# Patient Record
Sex: MALE
[De-identification: names, ages, dates, MRNs, and addresses within clinical notes are randomized per-mention and may not be internally consistent; named-entity substitution may affect disease eponyms.]

---

## 2019-08-21 ENCOUNTER — HOSPITAL ENCOUNTER (EMERGENCY)
Dept: HOSPITAL 56 - MW.ED | Age: 84
Discharge: HOME | End: 2019-08-21
Payer: OTHER GOVERNMENT

## 2019-08-21 VITALS — DIASTOLIC BLOOD PRESSURE: 67 MMHG | SYSTOLIC BLOOD PRESSURE: 120 MMHG

## 2019-08-21 DIAGNOSIS — E03.9: ICD-10-CM

## 2019-08-21 DIAGNOSIS — I80.02: Primary | ICD-10-CM

## 2019-08-21 DIAGNOSIS — M71.22: ICD-10-CM

## 2019-08-21 DIAGNOSIS — D47.3: ICD-10-CM

## 2019-08-21 DIAGNOSIS — Z79.899: ICD-10-CM

## 2019-08-21 LAB
CHLORIDE SERPL-SCNC: 106 MMOL/L (ref 98–107)
SODIUM SERPL-SCNC: 141 MMOL/L (ref 136–148)

## 2019-08-21 NOTE — EDM.PDOC
ED HPI GENERAL MEDICAL PROBLEM





- General


Chief Complaint: Lower Extremity Injury/Pain


Stated Complaint: SWOLLEN LFT FOOT


Time Seen by Provider: 08/21/19 11:42


Source of Information: Reports: Patient


History Limitations: Reports: No Limitations





- History of Present Illness


INITIAL COMMENTS - FREE TEXT/NARRATIVE: 


HISTORY AND PHYSICAL:





History of present illness:


Patient is an 87-year-old male presents to the ED today with concern of left 

lower extremity swelling 4 days. Patient states he first noticed the symptoms 

a few days ago. Patient states he does have some discomfort in this leg but 

states it is minimal. Patient denies any redness of the area or any injury of 

the extremity. Patient denies any h/o blood clots. Patient denies any health 

history or any other symptoms or concerns at this time.





Patient denies fever, chills, chest pain, shortness of breath, or cough. Denies 

headache, neck stiff ness, change in vision, syncope, or near syncope. Denies 

nausea, vomiting, abdominal pain, diarrhea, constipation, or dysuria. Has not 

noted any blood in urine or stool. Patient has been eating and drinking 

appropriately.





Review of systems: 


As per history of present illness and below otherwise all systems reviewed and 

negative.





Past medical history: 


As per history of present illness and as reviewed below otherwise 

noncontributory.





Surgical history: 


As per history of present illness and as reviewed below otherwise 

noncontributory.





Social history: 


See social history for further information





Family history: 


As per history of present illness and as reviewed below otherwise 

noncontributory.





Physical exam:


General: Patient is alert, oriented, and in no acute distress. Patient sitting 

comfortably on exam table.


HEENT: Atraumatic, normocephalic, pupils equal and reactive bilaterally, 

negative for conjunctival pallor or scleral icterus, mucous membranes moist, 

TMs normal bilaterally, throat clear, neck supple, nontender, trachea midline. 

No drooling or trismus noted. No meningeal signs. No hot potato voice noted. 


Lungs: Clear to auscultation, breath sounds equal bilaterally, chest nontender.


Heart: S1S2, regular rate and rhythm without overt murmur


Abdomen: Soft, nondistended, nontender. Negative for masses or 

hepatosplenomegaly. Negative for costovertebral tenderness.


Pelvis: Stable nontender.


Genitourinary: Deferred.


Rectal: Deferred.


Skin: Intact, warm, dry. No lesions or rashes noted.


Extremities: Atraumatic, negative for cords or calf pain. Neurovascular 

unremarkable. Left lower extremity is moderately edematous in comparison to 

right with 2+ pitting edema to the knee. Negative Homans sign. No erythema of 

the lower extremity and no break in skin. DP/PT pulses intact of left lower 

extremity and cap refill less than 2 seconds.


Neuro: Awake, alert, oriented. Cranial nerves II through XII unremarkable. 

Cerebellum unremarkable. Motor and sensory unremarkable throughout. Exam 

nonfocal.





Notes:


Discussed the importance for follow-up with his primary care provider and for 

repeat lab work including further investigation of platelets. Patient has had 

similar elevation and platelets in the past and states that he was not aware of 

this and has never had that evaluated. Patient does have a primary care 

provider at [place] and discussed the importance for close follow-up.


Voices understanding and is agreeable to plan of care. Denies any further 

questions or concerns at this time.





Diagnostics:


CBC, CMP, UA, EKG, lower extremity ultrasound, PT/INR, Peripheral blood smear





Therapeutics:


None





Prescription:


None





Impression: 


Superficial thrombophlebitis, left extremity


Popliteal cyst, left extremity


Thrombocytosis, unspecified





Plan:


1. You can use Tylenol as directed for pain and discomfort.


2. Follow-up with your primary care provider as discussed and for repeat 

labwork. Return to the ED as needed and as discussed.





Definitive disposition and diagnosis as appropriate pending reevaluation and 

review of above.








- Related Data


 Allergies











Allergy/AdvReac Type Severity Reaction Status Date / Time


 


No Known Allergies Allergy   Verified 08/21/19 11:59











Home Meds: 


 Home Meds





Digoxin 0.25 mg PO DAILY 07/06/16 [History]


Levothyroxine Sodium [Synthroid] 0.025 mg PO DAILY 07/06/16 [History]


Tamsulosin [Flomax] 0.4 mg PO ONETIME #10 cap.er 01/15/17 [Rx]


Finasteride  08/21/19 [History]


Hydroxyurea [Hydrea]  08/21/19 [History]


Pantoprazole Sodium 40 mg PO DAILY 08/21/19 [History]











Past Medical History


HEENT History: Reports: Hard of Hearing, Impaired Vision, Other (See Below)


Other HEENT History: right ear deaf, left ear with hearing aide,


Cardiovascular History: Reports: Arrhythmia


Other Cardiovascular History: paroxysmal atrial tachycardia in april 2016, rx 

with digitalis. rate controlled now.  active asymptomatic


Respiratory History: Reports: Other (See Below)


Other Respiratory History: pneumonia with sepsis in april 2016


Gastrointestinal History: Reports: Other (See Below)


Other Gastrointestinal History: Inguinal hernia, GI b;leed in April 2016, Hb 

now 12.6 on May 13, 2016


Genitourinary History: Reports: None


Musculoskeletal History: Reports: Fracture


Neurological History: Reports: None


Psychiatric History: Reports: None


Endocrine/Metabolic History: Reports: Hypothyroidism


Other Endocrine/Metabolic History: on thyroid replacement


Hematologic History: Reports: Anemia


Other Hematologic History: hospitalized in april with pneumonia and anemia


Immunologic History: Reports: None


Oncologic (Cancer) History: Reports: None


Dermatologic History: Reports: None





- Infectious Disease History


Infectious Disease History: Reports: Chicken Pox, Measles, Mumps





- Past Surgical History


HEENT Surgical History: Reports: Other (See Below)


Musculoskeletal Surgical History: Reports: Other (See Below)





Social & Family History





- Family History


Family Medical History: Noncontributory





- Caffeine Use


Caffeine Use: Reports: Coffee





- Living Situation & Occupation


Living situation: Reports: 


Occupation: Retired





Review of Systems





- Review of Systems


Review Of Systems: ROS reveals no pertinent complaints other than HPI.





ED EXAM, GENERAL





- Physical Exam


Exam: See Below (see dictation)





Course





- Vital Signs


Last Recorded V/S: 


 Last Vital Signs











Temp  35.7 C   08/21/19 11:57


 


Pulse  64   08/21/19 11:57


 


Resp  18   08/21/19 11:57


 


BP  141/85 H  08/21/19 11:57


 


Pulse Ox  97   08/21/19 11:57














- Orders/Labs/Meds


Orders: 


 Active Orders 24 hr











 Category Date Time Status


 


 EKG Documentation Completion [RC] STAT Care  08/21/19 12:04 Active


 


 UA RFX MAHENDRA AND CULT IF INDIC [URIN] Stat Lab  08/21/19 12:01 Ordered











Labs: 


 Laboratory Tests











  08/21/19 08/21/19 08/21/19 Range/Units





  13:09 13:09 13:09 


 


WBC  10.61    (4.0-11.0)  K/uL


 


RBC  3.84 L    (4.50-5.90)  M/uL


 


Hgb  11.4 L    (13.0-17.0)  g/dL


 


Hct  35.8 L    (38.0-50.0)  %


 


MCV  93.2    (80.0-98.0)  fL


 


MCH  29.7    (27.0-32.0)  pg


 


MCHC  31.8    (31.0-37.0)  g/dL


 


RDW Std Deviation  80.3 H    (28.0-62.0)  fl


 


RDW Coeff of Adan  24 H    (11.0-15.0)  %


 


Plt Count  1163 H    (150-400)  K/uL


 


MPV  9.50    (7.40-12.00)  fL


 


Neut % (Auto)  71.7    (48.0-80.0)  %


 


Lymph % (Auto)  13.2 L    (16.0-40.0)  %


 


Mono % (Auto)  10.1    (0.0-15.0)  %


 


Eos % (Auto)  4.1    (0.0-7.0)  %


 


Baso % (Auto)  0.9    (0.0-1.5)  %


 


Neut # (Auto)  7.6 H    (1.4-5.7)  K/uL


 


Lymph # (Auto)  1.4    (0.6-2.4)  K/uL


 


Mono # (Auto)  1.1 H    (0.0-0.8)  K/uL


 


Eos # (Auto)  0.4    (0.0-0.7)  K/uL


 


Baso # (Auto)  0.1    (0.0-0.1)  K/uL


 


Nucleated RBC %  0.0    /100WBC


 


Nucleated RBCs #  0    K/uL


 


Smear Path Review     


 


INR   1.10   


 


Sodium    141  (136-148)  mmol/L


 


Potassium    4.7  (3.5-5.1)  mmol/L


 


Chloride    106  ()  mmol/L


 


Carbon Dioxide    27.3  (21.0-32.0)  mmol/L


 


BUN    16  (7.0-18.0)  mg/dL


 


Creatinine    1.0  (0.8-1.3)  mg/dL


 


Est Cr Clr Drug Dosing    51.75  mL/min


 


Estimated GFR (MDRD)    > 60.0  ml/min


 


Glucose    75  ()  mg/dL


 


Calcium    8.7  (8.5-10.1)  mg/dL


 


Total Bilirubin    0.6  (0.2-1.0)  mg/dL


 


AST    21  (15-37)  IU/L


 


ALT    16  (14-63)  IU/L


 


Alkaline Phosphatase    87  ()  U/L


 


Total Protein    6.0 L  (6.4-8.2)  g/dL


 


Albumin    3.1 L  (3.4-5.0)  g/dL


 


Globulin    2.9  (2.6-4.0)  g/dL


 


Albumin/Globulin Ratio    1.1  (0.9-1.6)  














  08/21/19 Range/Units





  13:09 


 


WBC   (4.0-11.0)  K/uL


 


RBC   (4.50-5.90)  M/uL


 


Hgb   (13.0-17.0)  g/dL


 


Hct   (38.0-50.0)  %


 


MCV   (80.0-98.0)  fL


 


MCH   (27.0-32.0)  pg


 


MCHC   (31.0-37.0)  g/dL


 


RDW Std Deviation   (28.0-62.0)  fl


 


RDW Coeff of Adan   (11.0-15.0)  %


 


Plt Count   (150-400)  K/uL


 


MPV   (7.40-12.00)  fL


 


Neut % (Auto)   (48.0-80.0)  %


 


Lymph % (Auto)   (16.0-40.0)  %


 


Mono % (Auto)   (0.0-15.0)  %


 


Eos % (Auto)   (0.0-7.0)  %


 


Baso % (Auto)   (0.0-1.5)  %


 


Neut # (Auto)   (1.4-5.7)  K/uL


 


Lymph # (Auto)   (0.6-2.4)  K/uL


 


Mono # (Auto)   (0.0-0.8)  K/uL


 


Eos # (Auto)   (0.0-0.7)  K/uL


 


Baso # (Auto)   (0.0-0.1)  K/uL


 


Nucleated RBC %   /100WBC


 


Nucleated RBCs #   K/uL


 


Smear Path Review  SENT TO PATHOLOGY  


 


INR   


 


Sodium   (136-148)  mmol/L


 


Potassium   (3.5-5.1)  mmol/L


 


Chloride   ()  mmol/L


 


Carbon Dioxide   (21.0-32.0)  mmol/L


 


BUN   (7.0-18.0)  mg/dL


 


Creatinine   (0.8-1.3)  mg/dL


 


Est Cr Clr Drug Dosing   mL/min


 


Estimated GFR (MDRD)   ml/min


 


Glucose   ()  mg/dL


 


Calcium   (8.5-10.1)  mg/dL


 


Total Bilirubin   (0.2-1.0)  mg/dL


 


AST   (15-37)  IU/L


 


ALT   (14-63)  IU/L


 


Alkaline Phosphatase   ()  U/L


 


Total Protein   (6.4-8.2)  g/dL


 


Albumin   (3.4-5.0)  g/dL


 


Globulin   (2.6-4.0)  g/dL


 


Albumin/Globulin Ratio   (0.9-1.6)  














Departure





- Departure


Time of Disposition: 14:24


Disposition: Home, Self-Care 01


Clinical Impression: 


 Superficial thrombophlebitis of left leg, Thrombocytosis





Popliteal cyst


Qualifiers:


 Laterality: left Qualified Code(s): M71.22 - Synovial cyst of popliteal space [

Baker], left knee








- Discharge Information


Instructions:  Thrombophlebitis


Referrals: 


Anjel Monahan MD [Primary Care Provider] - 


Forms:  ED Department Discharge


Additional Instructions: 


The following information is given to patients seen in the emergency department 

who are being discharged to home. This information is to outline your options 

for follow-up care. We provide all patients seen in our emergency department 

with a follow-up referral.





The need for follow-up, as well as the timing and circumstances, are variable 

depending upon the specifics of your emergency department visit.





If you don't have a primary care physician on staff, we will provide you with a 

referral. We always advise you to contact your personal physician following an 

emergency department visit to inform them of the circumstance of the visit and 

for follow-up with them and/or the need for any referrals to a consulting 

specialist.





The emergency department will also refer you to a specialist when appropriate. 

This referral assures that you have the opportunity for follow-up care with a 

specialist. All of these measure are taken in an effort to provide you with 

optimal care, which includes your follow-up.





Under all circumstances we always encourage you to contact your private 

physician who remains a resource for coordinating your care. When calling for 

follow-up care, please make the office aware that this follow-up is from your 

recent emergency room visit. If for any reason you are refused follow-up, 

please contact the [facility] Emergency 

Department at [phone] and asked to speak to the emergency department 

charge nurse.





[facility]


Primary Care


[address] [place] [address]


Phone: [phone]


Fax: [phone]





[facility]


[address] [place] [address]


Phone: [phone]


Fax: [phone]





1. You can use Tylenol as directed for pain and discomfort. Packet on 

thrombophlebitis provided to you in this packet.


2. Follow-up with your primary care provider as discussed and for repeat 

labwork. Return to the ED as needed and as discussed.








 








- My Orders


Last 24 Hours: 


My Active Orders





08/21/19 12:01


UA RFX MAHENDRA AND CULT IF INDIC [URIN] Stat 





08/21/19 12:04


EKG Documentation Completion [RC] STAT 














- Assessment/Plan


Last 24 Hours: 


My Active Orders





08/21/19 12:01


UA RFX MAHENDRA AND CULT IF INDIC [URIN] Stat 





08/21/19 12:04


EKG Documentation Completion [RC] STAT

## 2019-08-21 NOTE — US
INDICATION:



Leg pain and swelling 



TECHNIQUE:



Ultrasound venous duplex lower left extremity.  Compression venous exam was 

performed using gray-scale, color Doppler, and spectral Doppler analysis.



COMPARISON:



None.



FINDINGS:



Sonographic imaging demonstrates the left common femoral, deep femoral, 

superficial femoral, popliteal, posterior tibial and greater saphenous and 

the contralateral right common femoral veins to be fully compressible with 

normal color Doppler blood flow. There is thrombosis involving severe 

varicose veins in the upper calf. A popliteal cyst with internal debris 

measures 4 x 2 x 1 cm. 



IMPRESSION:



No sign of DVT. There is acute superficial thrombophlebitis of varicose 

veins in the upper calf. A popliteal cyst is present.



Dictated by Clarke Garza MD @ Aug 21 2019  1:46PM



Signed by Dr. Clarke Garza @ Aug 21 2019  1:52PM

## 2019-10-31 ENCOUNTER — HOSPITAL ENCOUNTER (EMERGENCY)
Dept: HOSPITAL 56 - MW.ED | Age: 84
Discharge: HOME | End: 2019-10-31
Payer: OTHER GOVERNMENT

## 2019-10-31 VITALS — DIASTOLIC BLOOD PRESSURE: 54 MMHG | HEART RATE: 60 BPM | SYSTOLIC BLOOD PRESSURE: 133 MMHG

## 2019-10-31 DIAGNOSIS — S51.012A: Primary | ICD-10-CM

## 2019-10-31 DIAGNOSIS — Z23: ICD-10-CM
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
## 2019-10-31 DIAGNOSIS — Z79.82: ICD-10-CM

## 2019-10-31 DIAGNOSIS — H91.3: ICD-10-CM
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
## 2019-10-31 LAB
BUN SERPL-MCNC: 15 MG/DL (ref 7–18)
CHLORIDE SERPL-SCNC: 103 MMOL/L (ref 98–107)
CO2 SERPL-SCNC: 24.3 MMOL/L (ref 21–32)
GLUCOSE SERPL-MCNC: 81 MG/DL (ref 74–106)
POTASSIUM SERPL-SCNC: 4.5 MMOL/L (ref 3.5–5.1)
SODIUM SERPL-SCNC: 138 MMOL/L (ref 136–148)

## 2019-10-31 NOTE — CR
EXAM DATE: 10/31/19



PATIENT'S AGE: 88



Left elbow: Three views left elbow were obtained.



Comparison: No previous elbow study.



Minimal calcifications are seen off the medial and lateral epicondyles 
compatible with minimal tendinous calcification.  Bony structures are slightly 
osteopenic.  No acute fracture, dislocation or other bony abnormality is seen.



Impression:

1.  Findings which are felt to be incidental as noted above.

2.  Nothing acute is seen on left elbow study.



Diagnostic code #2



Report Signed by Proxy.

[email or web address]

## 2019-10-31 NOTE — EDM.PDOC
ED HPI GENERAL MEDICAL PROBLEM





- General


Chief Complaint: Upper Extremity Injury/Pain


Stated Complaint: PT FELL, LEFT ELBOW PAIN


Time Seen by Provider: 10/31/19 11:09


Source of Information: Reports: Patient


History Limitations: Reports: No Limitations





- History of Present Illness


INITIAL COMMENTS - FREE TEXT/NARRATIVE: 


HISTORY AND PHYSICAL:





History of present illness:


Patient is an 88-year-old male who presents and ambulates to the ED today with 

concern of elbow injury that occurred 4 days ago. 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 Patient states that he had pulled 

out a toe of decorations in his entryway and slipped on the carpet as he was 

pulling the tote of decorations. Patient states that he landed on his butt and 

scraped his elbow against the tote and had caught himself with his left elbow. 

Patient states he did not hit his head and did not lose consciousness. Patient 

states that he was not dizzy prior to this episode but just had slipped with 

carrying the tote of decorations. Patient states other than his left elbow he 

has no other symptoms or concerns. Patient states that he has tried some on 

dressings and his wife has tried to dress the wound of his left elbow but it 

continues to bleed. Patient states that she had put some Neosporin on it 

without relief of symptoms. Patient denies any other symptoms or concerns. 

Patient states he is not up-to-date on his tetanus vaccine.





Patient denies fever, chills, chest pain, shortness of breath, or cough. Denies 

headache, neck stiff ness, change in vision, syncope, or near syncope. Denies 

nausea, vomiting, abdominal pain, diarrhea, constipation, or dysuria. Has not 

noted any blood in urine or stool. Patient has been eating and drinking 

appropriately.





Review of systems: 


As per history of present illness and below otherwise all systems reviewed and 

negative.





Past medical history: 


As per history of present illness and as reviewed below otherwise 

noncontributory.





Surgical history: 


As per history of present illness and as reviewed below otherwise 

noncontributory.





Social history: 


See social history for further information





Family history: 


As per history of present illness and as reviewed below otherwise 

noncontributory.





Physical exam:


General: Patient is alert, oriented, and in no acute distress. Patient sitting 

comfortably on exam table.


HEENT: Atraumatic, normocephalic, pupils equal and reactive bilaterally, 

negative for conjunctival pallor or scleral icterus, mucous membranes moist, 

TMs normal bilaterally, throat clear, neck supple, nontender, trachea midline. 

No drooling or trismus noted. No meningeal signs. No hot potato voice noted. 


Lungs: Clear to auscultation, breath sounds equal bilaterally, chest nontender.


Heart: S1S2, regular rate and rhythm without overt murmur


Abdomen: Soft, nondistended, nontender. Negative for masses or 

hepatosplenomegaly. Negative for costovertebral tenderness.


Pelvis: Stable nontender.


Genitourinary: Deferred.


Rectal: Deferred.


Skin: Intact, warm, dry. No lesions or rashes noted.


Extremities: Negative for cords or calf pain. Neurovascular unremarkable. There 

is a 2 cm circular skin tear of the left elbow with mild bleeding. Patient has 

complete range of motion of upper and lower extremities without pain or 

difficulty. Radial pulses are grossly intact bilaterally with capillary refill 

less than 2 seconds. No obvious deformity of the complete spine. No step-offs, 

crepitus, or point tenderness to palpation of the complete spine.


Neuro: Awake, alert, oriented. Cranial nerves II through XII unremarkable. 

Cerebellum unremarkable. Motor and sensory unremarkable throughout. Exam 

nonfocal.





Notes:


Discussed the importance for follow-up with a primary care provider.


Voices understanding and is agreeable to plan of care. Denies any further 

questions or concerns at this time.





Diagnostics:


CBC, CMP, UA, EKG, chest x-ray, troponin, elbow x-ray, PT/INR





Therapeutics:


SurgiSeal sterile dressing, tdap





Prescription:


None





Impression: 


Left elbow injury


Skin tear of elbow, left





Plan:


1. Rest, ice, elevate the affected extremity. You can apply ice 15 minutes on, 

15 minutes off. Keep the dressing on applied today for 24 hours before removal.


2. Tylenol as directed for pain management or discomfort. 


3. Follow up with the primary care provider as discussed. Return to the ED as 

needed and as discussed.





Definitive disposition and diagnosis as appropriate pending reevaluation and 

review of above.





- Related Data


 Allergies











Allergy/AdvReac Type Severity Reaction Status Date / Time


 


No Known Allergies Allergy   Verified 10/31/19 11:14











Home Meds: 


 Home Meds





Digoxin 0.25 mg PO DAILY 07/06/16 [History]


Tamsulosin [Flomax] 0.4 mg PO ONETIME #10 cap.er 01/15/17 [Rx]


Finasteride 5 mg PO ASDIRECTED 08/21/19 [History]


Hydroxyurea [Hydrea] 500 mg PO ASDIRECTED 08/21/19 [History]


Pantoprazole Sodium 40 mg PO DAILY 08/21/19 [History]


Aspirin 81 mg PO DAILY 10/31/19 [History]











Past Medical History


HEENT History: Reports: Hard of Hearing, Impaired Vision, Other (See Below)


Other HEENT History: right ear deaf, left ear with hearing aide,


Cardiovascular History: Reports: Arrhythmia


Other Cardiovascular History: paroxysmal atrial tachycardia in april 2016, rx 

with digitalis. rate controlled now.  active asymptomatic


Respiratory History: Reports: Other (See Below)


Other Respiratory History: pneumonia with sepsis in april 2016


Gastrointestinal History: Reports: Other (See Below)


Other Gastrointestinal History: Inguinal hernia, GI b;leed in April 2016, Hb 

now 12.6 on May 13, 2016


Genitourinary History: Reports: None


Musculoskeletal History: Reports: Fracture


Neurological History: Reports: None


Psychiatric History: Reports: None


Endocrine/Metabolic History: Reports: Hypothyroidism


Other Endocrine/Metabolic History: on thyroid replacement


Hematologic History: Reports: Anemia


Other Hematologic History: hospitalized in april with pneumonia and anemia


Immunologic History: Reports: None


Oncologic (Cancer) History: Reports: None


Dermatologic History: Reports: None





- Infectious Disease History


Infectious Disease History: Reports: Chicken Pox, Measles, Mumps





- Past Surgical History


Head Surgeries/Procedures: Reports: None


HEENT Surgical History: Reports: Other (See Below)


Musculoskeletal Surgical History: Reports: Other (See Below)





Social & Family History





- Family History


Family Medical History: Noncontributory





- Tobacco Use


Smoking Status *Q: Never Smoker


Second Hand Smoke Exposure: No





- Caffeine Use


Caffeine Use: Reports: None





- Recreational Drug Use


Recreational Drug Use: No





- Living Situation & Occupation


Living situation: Reports: 


Occupation: Retired





Review of Systems





- Review of Systems


Review Of Systems: ROS reveals no pertinent complaints other than HPI.





ED EXAM, GENERAL





- Physical Exam


Exam: See Below (See dictation)





Course





- Vital Signs


Last Recorded V/S: 


 Last Vital Signs











Temp  96.4 F   10/31/19 11:14


 


Pulse  60   10/31/19 11:14


 


Resp  16   10/31/19 11:14


 


BP  133/54 L  10/31/19 11:14


 


Pulse Ox  98   10/31/19 11:14














- Orders/Labs/Meds


Orders: 


 Active Orders 24 hr











 Category Date Time Status


 


 EKG Documentation Completion [RC] STAT Care  10/31/19 11:32 Active


 


 Vaccines to be Administered [RC] PER UNIT ROUTINE Care  10/31/19 11:45 Active


 


 UA RFX MAHENDRA AND CULT IF INDIC [URIN] Stat Lab  10/31/19 11:32 Ordered











Labs: 


 Laboratory Tests











  10/31/19 10/31/19 10/31/19 Range/Units





  12:03 12:03 12:03 


 


WBC  12.07 H    (4.0-11.0)  K/uL


 


RBC  4.43 L    (4.50-5.90)  M/uL


 


Hgb  12.4 L    (13.0-17.0)  g/dL


 


Hct  39.2    (38.0-50.0)  %


 


MCV  88.5    (80.0-98.0)  fL


 


MCH  28.0    (27.0-32.0)  pg


 


MCHC  31.6    (31.0-37.0)  g/dL


 


RDW Std Deviation  78.3 H    (28.0-62.0)  fl


 


RDW Coeff of Adan  25 H    (11.0-15.0)  %


 


Plt Count  1402 H    (150-400)  K/uL


 


MPV  9.40    (7.40-12.00)  fL


 


Neut % (Auto)  70.7    (48.0-80.0)  %


 


Lymph % (Auto)  15.2 L    (16.0-40.0)  %


 


Mono % (Auto)  10.3    (0.0-15.0)  %


 


Eos % (Auto)  3.1    (0.0-7.0)  %


 


Baso % (Auto)  0.7    (0.0-1.5)  %


 


Neut # (Auto)  8.5 H    (1.4-5.7)  K/uL


 


Lymph # (Auto)  1.8    (0.6-2.4)  K/uL


 


Mono # (Auto)  1.2 H    (0.0-0.8)  K/uL


 


Eos # (Auto)  0.4    (0.0-0.7)  K/uL


 


Baso # (Auto)  0.1    (0.0-0.1)  K/uL


 


Nucleated RBC %  0.0    /100WBC


 


Nucleated RBCs #  0    K/uL


 


INR   1.13   


 


Sodium    138  (136-148)  mmol/L


 


Potassium    4.5  (3.5-5.1)  mmol/L


 


Chloride    103  ()  mmol/L


 


Carbon Dioxide    24.3  (21.0-32.0)  mmol/L


 


BUN    15  (7.0-18.0)  mg/dL


 


Creatinine    1.0  (0.8-1.3)  mg/dL


 


Est Cr Clr Drug Dosing    50.78  mL/min


 


Estimated GFR (MDRD)    > 60.0  ml/min


 


Glucose    81  ()  mg/dL


 


Calcium    8.7  (8.5-10.1)  mg/dL


 


Total Bilirubin    0.5  (0.2-1.0)  mg/dL


 


AST    32  (15-37)  IU/L


 


ALT    18  (14-63)  IU/L


 


Alkaline Phosphatase    79  ()  U/L


 


Troponin I    < 0.050  (0.000-0.056)  ng/mL


 


Total Protein    7.7  (6.4-8.2)  g/dL


 


Albumin    3.8  (3.4-5.0)  g/dL


 


Globulin    3.9  (2.6-4.0)  g/dL


 


Albumin/Globulin Ratio    1.0  (0.9-1.6)  











Meds: 


Medications














Discontinued Medications














Generic Name Dose Route Start Last Admin





  Trade Name Freq  PRN Reason Stop Dose Admin


 


Tetanus/Diphtheria Toxoids  0.5 ml  10/31/19 11:45  10/31/19 12:00





  Tenivac  IM  10/31/19 11:46  0.5 ml





  .ONCE ONE   Administration





     





     





     





     














Departure





- Departure


Time of Disposition: 12:51


Disposition: Home, Self-Care 01


Clinical Impression: 


Injury of left elbow


Qualifiers:


 Encounter type: initial encounter Qualified Code(s): S59.902A - Unspecified 

injury of left elbow, initial encounter





Skin tear of elbow without complication


Qualifiers:


 Encounter type: initial encounter Laterality: left Qualified Code(s): S51.012A 

- Laceration without foreign body of left elbow, initial encounter








- Discharge Information


Instructions:  Musculoskeletal Pain


Referrals: 


PCP,Unobtain [Primary Care Provider] - 


Forms:  ED Department Discharge


Additional Instructions: 


The following information is given to patients seen in the emergency department 

who are being discharged to home. This information is to outline your options 

for follow-up care. We provide all patients seen in our emergency department 

with a follow-up referral.





The need for follow-up, as well as the timing and circumstances, are variable 

depending upon the specifics of your emergency department visit.





If you don't have a primary care physician on staff, we will provide you with a 

referral. We always advise you to contact your personal physician following an 

emergency department visit to inform them of the circumstance of the visit and 

for follow-up with them and/or the need for any referrals to a consulting 

specialist.





The emergency department will also refer you to a specialist when appropriate. 

This referral assures that you have the opportunity for follow-up care with a 

specialist. All of these measure are taken in an effort to provide you with 

optimal care, which includes your follow-up.





Under all circumstances we always encourage you to contact your private 

physician who remains a resource for coordinating your care. When calling for 

follow-up care, please make the office aware that this follow-up is from your 

recent emergency room visit. If for any reason you are refused follow-up, 

please contact the [facility] Emergency 

Department at [phone] and asked to speak to the emergency department 

charge nurse.





[facility]


Primary Care


[address] [place] [address]


Phone: [phone]


Fax: [phone]





[place] [address]


Phone: [phone]


Fax: [phone]








1. Rest, ice, elevate the affected extremity. You can apply ice 15 minutes on, 

15 minutes off. Keep the dressing on applied today for 24 hours before removal.


2. Tylenol as directed for pain management or discomfort. 


3. Follow up with the primary care provider as discussed. Return to the ED as 

needed and as discussed.





 








- My Orders


Last 24 Hours: 


My Active Orders





10/31/19 11:32


EKG Documentation Completion [RC] STAT 


UA RFX MAHENDRA AND CULT IF INDIC [URIN] Stat 





10/31/19 11:45


Vaccines to be Administered [RC] PER UNIT ROUTINE 














- Assessment/Plan


Last 24 Hours: 


My Active Orders





10/31/19 11:32


EKG Documentation Completion [RC] STAT 


UA RFX MAHENDRA AND CULT IF INDIC [URIN] Stat 





10/31/19 11:45


Vaccines to be Administered [RC] PER UNIT ROUTINE

## 2020-03-16 ENCOUNTER — HOSPITAL ENCOUNTER (INPATIENT)
Dept: HOSPITAL 56 - MW.ED | Age: 85
LOS: 2 days | DRG: 872 | End: 2020-03-18
Attending: INTERNAL MEDICINE | Admitting: INTERNAL MEDICINE
Payer: OTHER GOVERNMENT

## 2020-03-16 DIAGNOSIS — K92.2: ICD-10-CM

## 2020-03-16 DIAGNOSIS — R57.8: ICD-10-CM

## 2020-03-16 DIAGNOSIS — Z79.82: ICD-10-CM

## 2020-03-16 DIAGNOSIS — Z79.899: ICD-10-CM

## 2020-03-16 DIAGNOSIS — R55: ICD-10-CM

## 2020-03-16 DIAGNOSIS — I47.1: ICD-10-CM

## 2020-03-16 DIAGNOSIS — Z86.718: ICD-10-CM

## 2020-03-16 DIAGNOSIS — Z51.5: ICD-10-CM

## 2020-03-16 DIAGNOSIS — Z97.4: ICD-10-CM

## 2020-03-16 DIAGNOSIS — Z66: ICD-10-CM

## 2020-03-16 DIAGNOSIS — I95.9: ICD-10-CM

## 2020-03-16 DIAGNOSIS — R15.9: ICD-10-CM

## 2020-03-16 DIAGNOSIS — H54.7: ICD-10-CM

## 2020-03-16 DIAGNOSIS — E03.9: ICD-10-CM

## 2020-03-16 DIAGNOSIS — A41.9: Primary | ICD-10-CM

## 2020-03-16 DIAGNOSIS — H91.93: ICD-10-CM

## 2020-03-16 DIAGNOSIS — Z87.01: ICD-10-CM

## 2020-03-16 DIAGNOSIS — D64.9: ICD-10-CM

## 2020-03-16 DIAGNOSIS — Z88.8: ICD-10-CM

## 2020-03-16 DIAGNOSIS — Z79.890: ICD-10-CM

## 2020-03-16 LAB
BUN SERPL-MCNC: 58 MG/DL (ref 7–18)
CHLORIDE SERPL-SCNC: 107 MMOL/L (ref 98–107)
CO2 SERPL-SCNC: 25.5 MMOL/L (ref 21–32)
GLUCOSE SERPL-MCNC: 179 MG/DL (ref 74–106)
LIPASE SERPL-CCNC: 599 U/L (ref 73–393)
POTASSIUM SERPL-SCNC: 5.3 MMOL/L (ref 3.5–5.1)
SODIUM SERPL-SCNC: 145 MMOL/L (ref 136–148)

## 2020-03-16 PROCEDURE — 86921 COMPATIBILITY TEST INCUBATE: CPT

## 2020-03-16 PROCEDURE — C9113 INJ PANTOPRAZOLE SODIUM, VIA: HCPCS

## 2020-03-16 PROCEDURE — 96361 HYDRATE IV INFUSION ADD-ON: CPT

## 2020-03-16 PROCEDURE — 85025 COMPLETE CBC W/AUTO DIFF WBC: CPT

## 2020-03-16 PROCEDURE — 84439 ASSAY OF FREE THYROXINE: CPT

## 2020-03-16 PROCEDURE — 93005 ELECTROCARDIOGRAM TRACING: CPT

## 2020-03-16 PROCEDURE — 71045 X-RAY EXAM CHEST 1 VIEW: CPT

## 2020-03-16 PROCEDURE — 86900 BLOOD TYPING SEROLOGIC ABO: CPT

## 2020-03-16 PROCEDURE — 84484 ASSAY OF TROPONIN QUANT: CPT

## 2020-03-16 PROCEDURE — 83690 ASSAY OF LIPASE: CPT

## 2020-03-16 PROCEDURE — 86901 BLOOD TYPING SEROLOGIC RH(D): CPT

## 2020-03-16 PROCEDURE — 86850 RBC ANTIBODY SCREEN: CPT

## 2020-03-16 PROCEDURE — 87040 BLOOD CULTURE FOR BACTERIA: CPT

## 2020-03-16 PROCEDURE — 96376 TX/PRO/DX INJ SAME DRUG ADON: CPT

## 2020-03-16 PROCEDURE — 80053 COMPREHEN METABOLIC PANEL: CPT

## 2020-03-16 PROCEDURE — 83605 ASSAY OF LACTIC ACID: CPT

## 2020-03-16 PROCEDURE — 36415 COLL VENOUS BLD VENIPUNCTURE: CPT

## 2020-03-16 PROCEDURE — 99291 CRITICAL CARE FIRST HOUR: CPT

## 2020-03-16 PROCEDURE — 86920 COMPATIBILITY TEST SPIN: CPT

## 2020-03-16 PROCEDURE — 96375 TX/PRO/DX INJ NEW DRUG ADDON: CPT

## 2020-03-16 PROCEDURE — 84443 ASSAY THYROID STIM HORMONE: CPT

## 2020-03-16 PROCEDURE — P9016 RBC LEUKOCYTES REDUCED: HCPCS

## 2020-03-16 PROCEDURE — 86922 COMPATIBILITY TEST ANTIGLOB: CPT

## 2020-03-16 PROCEDURE — 80162 ASSAY OF DIGOXIN TOTAL: CPT

## 2020-03-16 PROCEDURE — 96374 THER/PROPH/DIAG INJ IV PUSH: CPT

## 2020-03-16 PROCEDURE — 85610 PROTHROMBIN TIME: CPT

## 2020-03-16 NOTE — CR
Chest: Portable view of the chest was obtained.

 

Comparison: Prior chest x-ray of 10/31/19.

 

Heart size has a left ventricular configuration.  Tortuous thoracic 

aorta is seen.  Slight blunting of the lateral left costophrenic angle

 believed to represent slight atelectasis.  Lungs otherwise are clear.

  Bony structures are grossly intact.

 

Impression:

1.  Slight atelectasis within the lateral left costophrenic angle.

2.  Nothing acute is appreciated.

 

Diagnostic code #2

 

Study was dictated in MDT

## 2020-03-16 NOTE — PCM.HP.2
H&P History of Present Illness





- General


Date of Service: 03/16/20


Admit Problem/Dx: 


 Admission Diagnosis/Problem





Admission Diagnosis/Problem      GI bleed not requiring more than 4 units of


                                 blood in 24 hours, ICU, or surgery











- History of Present Illness


Initial Comments - Free Text/Narative: 





89 yo male who presented with one day history of nausea and vomiting.  He also 

became lightheaded and reported generalized weakness.  Family reports bloody 

emesis and blood in his stool.  Patient denies any abdominal pain or history of 

GI bleed.  HE denies any NSAID use or anticoagulation.  He does have a history 

of SVT.  His BP on admission was 84/43 HR of 103.  In the ED he was noted to 

have a WBC of 24,000 and lactic acid of 5.8.  PAtient did not want transfer or 

referral for endoscopy for what is suspected to be acute GI bleed.





- Related Data


Allergies/Adverse Reactions: 


 Allergies











Allergy/AdvReac Type Severity Reaction Status Date / Time


 


No Known Allergies Allergy   Verified 03/17/20 01:22











Home Medications: 


 Home Meds





Digoxin 0.25 mg PO DAILY 07/06/16 [History]


Tamsulosin [Flomax] 0.4 mg PO ONETIME #10 cap.er 01/15/17 [Rx]


Finasteride 5 mg PO ASDIRECTED 08/21/19 [History]


Hydroxyurea [Hydrea] 500 mg PO ASDIRECTED 08/21/19 [History]


Pantoprazole Sodium 40 mg PO DAILY 08/21/19 [History]


Aspirin 81 mg PO DAILY 10/31/19 [History]











Past Medical History


HEENT History: Reports: Hard of Hearing, Impaired Vision, Other (See Below)


Other HEENT History: right ear deaf, left ear with hearing aide,


Cardiovascular History: Reports: Arrhythmia


Other Cardiovascular History: paroxysmal atrial tachycardia in april 2016, rx 

with digitalis. rate controlled now.  active asymptomatic


Respiratory History: Reports: Other (See Below)


Other Respiratory History: pneumonia with sepsis in april 2016


Gastrointestinal History: Reports: Other (See Below)


Other Gastrointestinal History: Inguinal hernia, GI b;leed in April 2016, Hb 

now 12.6 on May 13, 2016


Genitourinary History: Reports: None


Musculoskeletal History: Reports: Fracture


Neurological History: Reports: None


Psychiatric History: Reports: None


Endocrine/Metabolic History: Reports: Hypothyroidism


Other Endocrine/Metabolic History: on thyroid replacement


Hematologic History: Reports: Anemia


Other Hematologic History: hospitalized in april with pneumonia and anemia


Immunologic History: Reports: None


Oncologic (Cancer) History: Reports: None


Dermatologic History: Reports: None





- Infectious Disease History


Infectious Disease History: Reports: Chicken Pox, Measles, Mumps





- Past Surgical History


Head Surgeries/Procedures: Reports: None


HEENT Surgical History: Reports: Other (See Below)


Musculoskeletal Surgical History: Reports: Other (See Below)





Social & Family History





- Family History


Family Medical History: Noncontributory





- Tobacco Use


Smoking Status *Q: Never Smoker


Second Hand Smoke Exposure: No





- Caffeine Use


Caffeine Use: Reports: Coffee


Caffeine Use Comment: pt drinks coffee every morning





- Recreational Drug Use


Recreational Drug Use: No





- Living Situation & Occupation


Living situation: Reports: 


Occupation: Retired





H&P Review of Systems





- Review of Systems:


Review Of Systems: Comprehensive ROS is negative, except as noted in HPI.





Exam





- Exam


Exam: See Below





- Vital Signs


Vital Signs: 


 Last Vital Signs











Temp  36.1 C   03/16/20 19:16


 


Pulse  106 H  03/16/20 22:11


 


Resp  20   03/16/20 20:00


 


BP  99/49 L  03/16/20 22:11


 


Pulse Ox  95   03/16/20 20:00











Weight: 175 kg





- Exam


General: Alert, Oriented


HEENT: Mucosa Moist & Pink


Lungs: Clear to Auscultation, Normal Respiratory Effort


Cardiovascular: Regular Rate, Regular Rhythm


GI/Abdominal Exam: Soft, Non-Tender


Neurological: No: Focal Deficit





- Patient Data


Lab Results Last 24 hrs: 


 Laboratory Results - last 24 hr











  03/16/20 03/16/20 03/16/20 Range/Units





  19:08 19:08 19:08 


 


WBC  24.45 H    (4.0-11.0)  K/uL


 


RBC  3.59 L    (4.50-5.90)  M/uL


 


Hgb  10.2 L    (13.0-17.0)  g/dL


 


Hct  33.2 L    (38.0-50.0)  %


 


MCV  92.5    (80.0-98.0)  fL


 


MCH  28.4    (27.0-32.0)  pg


 


MCHC  30.7 L    (31.0-37.0)  g/dL


 


RDW Std Deviation  83.7 H    (28.0-62.0)  fl


 


RDW Coeff of Adan  25 H    (11.0-15.0)  %


 


Plt Count  1431 H    (150-400)  K/uL


 


MPV  9.80    (7.40-12.00)  fL


 


Neut % (Auto)  82.1 H    (48.0-80.0)  %


 


Lymph % (Auto)  9.2 L    (16.0-40.0)  %


 


Mono % (Auto)  7.8    (0.0-15.0)  %


 


Eos % (Auto)  0.4    (0.0-7.0)  %


 


Baso % (Auto)  0.5    (0.0-1.5)  %


 


Neut # (Auto)  20.1 H    (1.4-5.7)  K/uL


 


Lymph # (Auto)  2.3    (0.6-2.4)  K/uL


 


Mono # (Auto)  1.9 H    (0.0-0.8)  K/uL


 


Eos # (Auto)  0.1    (0.0-0.7)  K/uL


 


Baso # (Auto)  0.1    (0.0-0.1)  K/uL


 


Nucleated RBC %  0.6    /100WBC


 


Nucleated RBCs #  0    K/uL


 


INR    1.15  


 


Lactate     (0.20-2.00)  mmol/L


 


Sodium   145   (136-148)  mmol/L


 


Potassium   5.3 H   (3.5-5.1)  mmol/L


 


Chloride   107   ()  mmol/L


 


Carbon Dioxide   25.5   (21.0-32.0)  mmol/L


 


BUN   58 H   (7.0-18.0)  mg/dL


 


Creatinine   1.3   (0.8-1.3)  mg/dL


 


Est Cr Clr Drug Dosing   TNP   


 


Estimated GFR (MDRD)   52.1   ml/min


 


Glucose   179 H   ()  mg/dL


 


Calcium   9.4   (8.5-10.1)  mg/dL


 


Total Bilirubin   0.7   (0.2-1.0)  mg/dL


 


AST   23   (15-37)  IU/L


 


ALT   15   (14-63)  IU/L


 


Alkaline Phosphatase   76   ()  U/L


 


Troponin I   < 0.050   (0.000-0.056)  ng/mL


 


Total Protein   6.1 L   (6.4-8.2)  g/dL


 


Albumin   3.3 L   (3.4-5.0)  g/dL


 


Globulin   2.8   (2.6-4.0)  g/dL


 


Albumin/Globulin Ratio   1.2   (0.9-1.6)  


 


Lipase   599 H   ()  U/L


 


Free T4     (0.76-1.46)  ng/dL


 


TSH 3rd Generation   8.09 H   (0.36-3.74)  uIU/mL


 


Digoxin   0.1 L   (0.9-2.0)  ng/mL


 


Blood Type     


 


Antibody Screen     














  03/16/20 03/16/20 03/16/20 Range/Units





  19:08 19:08 21:08 


 


WBC     (4.0-11.0)  K/uL


 


RBC     (4.50-5.90)  M/uL


 


Hgb     (13.0-17.0)  g/dL


 


Hct     (38.0-50.0)  %


 


MCV     (80.0-98.0)  fL


 


MCH     (27.0-32.0)  pg


 


MCHC     (31.0-37.0)  g/dL


 


RDW Std Deviation     (28.0-62.0)  fl


 


RDW Coeff of Adan     (11.0-15.0)  %


 


Plt Count     (150-400)  K/uL


 


MPV     (7.40-12.00)  fL


 


Neut % (Auto)     (48.0-80.0)  %


 


Lymph % (Auto)     (16.0-40.0)  %


 


Mono % (Auto)     (0.0-15.0)  %


 


Eos % (Auto)     (0.0-7.0)  %


 


Baso % (Auto)     (0.0-1.5)  %


 


Neut # (Auto)     (1.4-5.7)  K/uL


 


Lymph # (Auto)     (0.6-2.4)  K/uL


 


Mono # (Auto)     (0.0-0.8)  K/uL


 


Eos # (Auto)     (0.0-0.7)  K/uL


 


Baso # (Auto)     (0.0-0.1)  K/uL


 


Nucleated RBC %     /100WBC


 


Nucleated RBCs #     K/uL


 


INR     


 


Lactate   5.8 H*   (0.20-2.00)  mmol/L


 


Sodium     (136-148)  mmol/L


 


Potassium     (3.5-5.1)  mmol/L


 


Chloride     ()  mmol/L


 


Carbon Dioxide     (21.0-32.0)  mmol/L


 


BUN     (7.0-18.0)  mg/dL


 


Creatinine     (0.8-1.3)  mg/dL


 


Est Cr Clr Drug Dosing     


 


Estimated GFR (MDRD)     ml/min


 


Glucose     ()  mg/dL


 


Calcium     (8.5-10.1)  mg/dL


 


Total Bilirubin     (0.2-1.0)  mg/dL


 


AST     (15-37)  IU/L


 


ALT     (14-63)  IU/L


 


Alkaline Phosphatase     ()  U/L


 


Troponin I     (0.000-0.056)  ng/mL


 


Total Protein     (6.4-8.2)  g/dL


 


Albumin     (3.4-5.0)  g/dL


 


Globulin     (2.6-4.0)  g/dL


 


Albumin/Globulin Ratio     (0.9-1.6)  


 


Lipase     ()  U/L


 


Free T4    1.23  (0.76-1.46)  ng/dL


 


TSH 3rd Generation     (0.36-3.74)  uIU/mL


 


Digoxin     (0.9-2.0)  ng/mL


 


Blood Type  O POSITIVE    


 


Antibody Screen  NEGATIVE    











Result Diagrams: 


 03/17/20 06:35





 03/17/20 06:35


[facility] Results Last 24 hrs: 


 Microbiology











 03/16/20 19:08 Anaerobic Blood Culture - Final





 Blood - Venous - Iv Start 














Sepsis Event Note





- Evaluation


Sepsis Screening Result: No Definite Risk





- Focused Exam


Vital Signs: 


 Vital Signs











  Temp Pulse Resp BP Pulse Ox


 


 03/16/20 22:11   106 H   99/49 L 


 


 03/16/20 21:47   111 H   93/47 L 


 


 03/16/20 21:00   114 H   89/52 L 


 


 03/16/20 20:00   103 H  20  108/52 L  95


 


 03/16/20 19:28   83  8 L  85/43 L 


 


 03/16/20 19:16  36.1 C  84  15  104/46 L  94 L











Date Exam was Performed: 03/17/20


Time Exam was Performed: 07:35


Problem List Initiated/Reviewed/Updated: Yes


Orders Last 24hrs: 


 Active Orders 24 hr











 Category Date Time Status


 


 Admission Status [Patient Status] [ADT] Stat ADT  03/16/20 21:27 Active


 


 Antiembolic Devices [RC] PER UNIT ROUTINE Care  03/16/20 23:24 Ordered


 


 EKG Documentation Completion [RC] STAT Care  03/16/20 19:10 Active


 


 Gastrointestinal Tube Mgmt [RC] ASDIRECTED Care  03/16/20 19:11 Active


 


 Oxygen Therapy [RC] PRN Care  03/16/20 23:23 Ordered


 


 Up ad Carol [RC] ASDIRECTED Care  03/16/20 23:23 Ordered


 


 VTE/DVT Education [RC] PER UNIT ROUTINE Care  03/16/20 23:23 Ordered


 


 Vital Signs [RC] Q4H Care  03/16/20 23:23 Ordered


 


 Nothing per Oral Now Diet [DIET] Diet  03/16/20 Breakfast Ordered


 


 Abdomen Pelvis w wo Cont [CT] Routine Exams  03/16/20 23:22 Ordered


 


 CBC WITH AUTO DIFF [HEME] AM Lab  03/17/20 05:11 Ordered


 


 CBC WITH AUTO DIFF [HEME] Routine Lab  03/16/20 23:21 Ordered


 


 COMPREHENSIVE METABOLIC PN,CMP [CHEM] AM Lab  03/17/20 05:11 Ordered


 


 CULTURE BLOOD [BC] Stat Lab  03/16/20 19:08 Results


 


 CULTURE BLOOD [BC] Stat Lab  03/16/20 21:37 Received


 


 LACTIC ACID,WHOLE BLOOD [BG] AM Lab  03/17/20 05:11 Ordered


 


 LACTIC ACID,WHOLE BLOOD [BG] Routine Lab  03/16/20 23:21 Ordered


 


 UA RFX [place] AND CULT IF INDIC [URIN] Stat Lab  03/16/20 20:46 Ordered


 


 Ondansetron [Zofran] Med  03/16/20 23:23 Ordered





 4 mg IVPUSH Q4H PRN   


 


 Pantoprazole [ProTONIX IV***] 80 mg Med  03/16/20 21:15 Active





 Sodium Chloride 0.9% [Normal Saline] 100 ml   





 IV Q24H   


 


 Sodium Chloride 0.9% @ 125 MLS/HR (1,000ml) Med  03/16/20 23:30 Ordered





 Sodium Chloride 0.9% [Normal Saline] 1,000 ml   





 IV ASDIRECTED   


 


 Blood Culture x2 Reflex Set [OM.PC] Stat Oth  03/16/20 20:36 Ordered


 


 NG [Nasogastric Orogastric Tube Insertion] [OM.PC] Stat Oth  03/16/20 19:11 

Ordered


 


 Sequential Compression Device [OM.PC] Per Unit Routine Oth  03/16/20 23:23 

Ordered


 


 Resuscitation Status Routine Resus Stat  03/16/20 23:23 Ordered








 Medication Orders





Pantoprazole Sodium 80 mg/ (Sodium Chloride)  100 mls @ 8 mls/hr IV Q24H [facility]


   Last Admin: 03/16/20 21:18  Dose: 8 mls/hr


Sodium Chloride (Normal Saline)  1,000 mls @ 125 mls/hr IV ASDIRECTED [facility]


Ondansetron HCl (Zofran)  4 mg IVPUSH Q4H PRN


   PRN Reason: Nausea








Assessment/Plan Comment:: 





89 yo male admitted for acute GI bleed.  We will continue IV protonix, fluid 

resuscitation and bowel rest.  Hgb droped to 6.9 will transfuse two units.  

Patient is on Rocephin until sepsis ruled out.  CT scan of the abdomen and 

pelvis ordered.  Patient is a DNR/DNI.

## 2020-03-16 NOTE — CR
Chest: Portable view of the chest was obtained.

 

Comparison: Prior chest x-ray performed earlier on the same day (7:22 

PM).

 

Nasogastric tube is seen.  Tip is coiled within the stomach.  Chest 

x-ray is otherwise stable.

 

Impression:

1.  Nasogastric tube coiled within the stomach.

2.  Nothing acute is otherwise seen.  No other change is seen from 

prior chest x-ray.

 

Diagnostic code #2

 

Study was dictated in MDT

## 2020-03-17 LAB
BUN SERPL-MCNC: 58 MG/DL (ref 7–18)
CHLORIDE SERPL-SCNC: 112 MMOL/L (ref 98–107)
CO2 SERPL-SCNC: 23.2 MMOL/L (ref 21–32)
GLUCOSE SERPL-MCNC: 124 MG/DL (ref 74–106)
POTASSIUM SERPL-SCNC: 5.2 MMOL/L (ref 3.5–5.1)
SODIUM SERPL-SCNC: 144 MMOL/L (ref 136–148)

## 2020-03-17 PROCEDURE — 30233N1 TRANSFUSION OF NONAUTOLOGOUS RED BLOOD CELLS INTO PERIPHERAL VEIN, PERCUTANEOUS APPROACH: ICD-10-PCS | Performed by: INTERNAL MEDICINE

## 2020-03-17 RX ADMIN — SODIUM CHLORIDE SCH MLS/HR: 9 INJECTION, SOLUTION INTRAMUSCULAR; INTRAVENOUS; SUBCUTANEOUS at 17:27

## 2020-03-17 NOTE — EDM.PDOC
ED HPI GENERAL MEDICAL PROBLEM





- General


Chief Complaint: Gastrointestinal Problem


Stated Complaint: EMS ARRIVAL


Time Seen by Provider: 03/16/20 19:10


Source of Information: Reports: EMS, Family





- History of Present Illness


INITIAL COMMENTS - FREE TEXT/NARRATIVE: 


HISTORY OF PRESENT ILLNESS:  Patient is 88-year-old male history of A. fib who 

presents to the ER with EMS for evaluation of likely GI bleed and syncope.  

Patient was at home today and had 2 episodes of syncope followed by 

incontinence of stool which was black and subsequent vomiting which appeared to 

be coffee-ground in nature.  Patient is not on any anticoagulants.  He denies 

any abdominal pain chest pain or shortness of breath.  He is actively vomiting, 

pale cool, hard of hearing all further history obtained through son and wife as 

well as EMS.  Patient seen immediately upon arrival and required my immediate 

medical attention








REVIEW OF SYSTEMS:  unable to obtain due to critical nature, pt not answering 

questions








 PAST MEDICAL HISTORY: reviewed as per nursing notes


 SOCIAL HISTORY:  reviewed as per nursing notes,


 MEDICATIONS:  Per nurse's note


 ALLERGIES:  Per nurse's note, reviewed by me 














PHYSICAL EXAMINATION:


 GENERALIZED APPEARANCE: well developed, well nourished in moderate distress, 

actively vomiting brown to black material


 VITAL SIGNS:  Per nurse's note, reviewed by me 


 SKIN:  pale, cool dry; (-) cyanosis; (-) rash.


 HEAD:  (-) scalp swelling, (-) tenderness.


 EYES:  (+) conjunctival pallor, (-) scleral icterus.


 ENMT:   (-) stridor; mucous membranes moist.


 NECK:  (-) tenderness, (-) stiffness,


 CHEST AND RESPIRATORY:  (-) rales, (-) rhonchi, (-) wheezes; breath sounds 

equal bilaterally.


 HEART AND CARDIOVASCULAR:  (+) irregularity; (-) murmur, (-) gallop.


 ABDOMEN AND GI:  Soft; (-) tenderness, (-) guarding, (-) rebound, (-) palpable 

masses,


 EXTREMITIES:  (-) deformity, (-) edema.  


 NEURO AND PSYCH: eyes closed, opens to voice.  Cranial nerves grossly intact; 

strength symmetric. gait not tested


   


 DIAGNOSTICS:








EKG: afib at 103 bpm. nml axis. no st elevation or depression. 


CXR: as read by radiologist, reviewed by myself


Labs ordered and reviewed. 





critical care time: 31 min exclusive of minutes for separately reportable 

procedures.








EMERGENCY DEPARTMENT COURSE AND TREATMENT:  Patient's condition improved during 

Emergency Department evaluation.  Patient seen immediately upon arrival and 

required my immediate medical attention.  IV x2 established and given 2 L 

normal saline wide open.  Protonix 80 mg followed by 8 mg/h were ordered.  Labs 

and diagnostics were ordered.  Patient's white count noted to be elevated as 

well as lactic acid.  At that time blood cultures x2 were ordered followed by 

Rocephin in case patient requires endoscopy. Sepsis not initially on 

differential as patient with GIB.  Lipase noted. Pt not stable for CT at this 

time and no tenderness on examination. Case was discussed with son and wife who 

both agree patient is DNR/DNI but do agree to vasopressors if necessary. 

Otherwise would like comfort care only.  Having further episodes of emesis.  

Son and wife agreeable with NG tube.  NG tube placed. Patient became more awake 

and alert. Type and screened upon initial evaluation, does not require emergent 

transfusion presently. Case d/w Dr. Prince who kindly agrees to admit. 














PLAN AND FOLLOW-UP:  admit. 


 











- Related Data


 Allergies











Allergy/AdvReac Type Severity Reaction Status Date / Time


 


No Known Allergies Allergy   Verified 03/17/20 01:22











Home Meds: 


 Home Meds





Digoxin 0.25 mg PO DAILY 07/06/16 [History]


Tamsulosin [Flomax] 0.4 mg PO ONETIME #10 cap.er 01/15/17 [Rx]


Finasteride 5 mg PO ASDIRECTED 08/21/19 [History]


Hydroxyurea [Hydrea] 500 mg PO ASDIRECTED 08/21/19 [History]


Pantoprazole Sodium 40 mg PO DAILY 08/21/19 [History]


Aspirin 81 mg PO DAILY 10/31/19 [History]











Past Medical History


HEENT History: Reports: Hard of Hearing, Impaired Vision, Other (See Below)


Other HEENT History: right ear deaf, left ear with hearing aide,


Cardiovascular History: Reports: Arrhythmia


Other Cardiovascular History: paroxysmal atrial tachycardia in april 2016, rx 

with digitalis. rate controlled now.  active asymptomatic


Respiratory History: Reports: Other (See Below)


Other Respiratory History: pneumonia with sepsis in april 2016


Gastrointestinal History: Reports: Other (See Below)


Other Gastrointestinal History: Inguinal hernia, GI b;leed in April 2016, Hb 

now 12.6 on May 13, 2016


Genitourinary History: Reports: None


Musculoskeletal History: Reports: Fracture


Neurological History: Reports: None


Psychiatric History: Reports: None


Endocrine/Metabolic History: Reports: Hypothyroidism


Other Endocrine/Metabolic History: on thyroid replacement


Hematologic History: Reports: Anemia


Other Hematologic History: hospitalized in april with pneumonia and anemia


Immunologic History: Reports: None


Oncologic (Cancer) History: Reports: None


Dermatologic History: Reports: None





- Infectious Disease History


Infectious Disease History: Reports: Chicken Pox, Measles, Mumps





- Past Surgical History


Head Surgeries/Procedures: Reports: None


HEENT Surgical History: Reports: Other (See Below)


Musculoskeletal Surgical History: Reports: Other (See Below)





Social & Family History





- Family History


Family Medical History: Noncontributory





- Tobacco Use


Smoking Status *Q: Never Smoker


Second Hand Smoke Exposure: No





- Caffeine Use


Caffeine Use: Reports: Coffee


Caffeine Use Comment: pt drinks coffee every morning





- Recreational Drug Use


Recreational Drug Use: No





- Living Situation & Occupation


Living situation: Reports: 


Occupation: Retired





ED ROS GENERAL





- Review of Systems


Review Of Systems: Unable To Obtain ([place])


Reason Not Obtained: [facility]





ED EXAM, GENERAL





- Physical Exam


Exam: See Below (see dictation)


GI/Abdominal: Soft, Non-Tender





Course





- Vital Signs


Last Recorded V/S: 





 Last Vital Signs











Temp  97.4 F   03/17/20 04:00


 


Pulse  105 H  03/17/20 04:00


 


Resp  21 H  03/17/20 04:00


 


BP  96/49 L  03/17/20 04:00


 


Pulse Ox  95   03/17/20 04:00














- Orders/Labs/Meds


Orders: 





 Active Orders 24 hr











 Category Date Time Status


 


 EKG Documentation Completion [RC] STAT Care  03/16/20 19:10 Active


 


 Gastrointestinal Tube Mgmt [RC] ASDIRECTED Care  03/16/20 19:11 Active


 


 CULTURE BLOOD [BC] Stat Lab  03/16/20 19:08 Results


 


 CULTURE BLOOD [BC] Stat Lab  03/16/20 21:37 Received


 


 UA RFX MAHENDRA AND CULT IF INDIC [URIN] Stat Lab  03/16/20 23:04 Ordered


 


 Pantoprazole [ProTONIX IV***] 80 mg Med  03/16/20 21:15 Active





 Sodium Chloride 0.9% [Normal Saline] 100 ml   





 IV Q24H   


 


 Blood Culture x2 Reflex Set [OM.PC] Stat Oth  03/16/20 20:36 Ordered


 


 NG [Nasogastric Orogastric Tube Insertion] [OM.PC] Stat Oth  03/16/20 19:11 

Ordered








 Medication Orders





Pantoprazole Sodium 80 mg/ (Sodium Chloride)  100 mls @ 8 mls/hr IV Q24H [facility]


   Last Admin: 03/16/20 21:18  Dose: 8 mls/hr


Sodium Chloride (Normal Saline)  1,000 mls @ 125 mls/hr IV ASDIRECTED [facility]


Ondansetron HCl (Zofran)  4 mg IVPUSH Q4H PRN


   PRN Reason: Nausea








Labs: 





 Laboratory Tests











  03/16/20 03/16/20 03/16/20 Range/Units





  19:08 19:08 19:08 


 


WBC  24.45 H    (4.0-11.0)  K/uL


 


RBC  3.59 L    (4.50-5.90)  M/uL


 


Hgb  10.2 L    (13.0-17.0)  g/dL


 


Hct  33.2 L    (38.0-50.0)  %


 


MCV  92.5    (80.0-98.0)  fL


 


MCH  28.4    (27.0-32.0)  pg


 


MCHC  30.7 L    (31.0-37.0)  g/dL


 


RDW Std Deviation  83.7 H    (28.0-62.0)  fl


 


RDW Coeff of Adan  25 H    (11.0-15.0)  %


 


Plt Count  1431 H    (150-400)  K/uL


 


MPV  9.80    (7.40-12.00)  fL


 


Neut % (Auto)  82.1 H    (48.0-80.0)  %


 


Lymph % (Auto)  9.2 L    (16.0-40.0)  %


 


Mono % (Auto)  7.8    (0.0-15.0)  %


 


Eos % (Auto)  0.4    (0.0-7.0)  %


 


Baso % (Auto)  0.5    (0.0-1.5)  %


 


Neut # (Auto)  20.1 H    (1.4-5.7)  K/uL


 


Lymph # (Auto)  2.3    (0.6-2.4)  K/uL


 


Mono # (Auto)  1.9 H    (0.0-0.8)  K/uL


 


Eos # (Auto)  0.1    (0.0-0.7)  K/uL


 


Baso # (Auto)  0.1    (0.0-0.1)  K/uL


 


Nucleated RBC %  0.6    /100WBC


 


Nucleated RBCs #  0    K/uL


 


INR    1.15  


 


Lactate     (0.20-2.00)  mmol/L


 


Sodium   145   (136-148)  mmol/L


 


Potassium   5.3 H   (3.5-5.1)  mmol/L


 


Chloride   107   ()  mmol/L


 


Carbon Dioxide   25.5   (21.0-32.0)  mmol/L


 


BUN   58 H   (7.0-18.0)  mg/dL


 


Creatinine   1.3   (0.8-1.3)  mg/dL


 


Est Cr Clr Drug Dosing   TNP   


 


Estimated GFR (MDRD)   52.1   ml/min


 


Glucose   179 H   ()  mg/dL


 


Calcium   9.4   (8.5-10.1)  mg/dL


 


Total Bilirubin   0.7   (0.2-1.0)  mg/dL


 


AST   23   (15-37)  IU/L


 


ALT   15   (14-63)  IU/L


 


Alkaline Phosphatase   76   ()  U/L


 


Troponin I   < 0.050   (0.000-0.056)  ng/mL


 


Total Protein   6.1 L   (6.4-8.2)  g/dL


 


Albumin   3.3 L   (3.4-5.0)  g/dL


 


Globulin   2.8   (2.6-4.0)  g/dL


 


Albumin/Globulin Ratio   1.2   (0.9-1.6)  


 


Lipase   599 H   ()  U/L


 


Free T4     (0.76-1.46)  ng/dL


 


TSH 3rd Generation   8.09 H   (0.36-3.74)  uIU/mL


 


Digoxin   0.1 L   (0.9-2.0)  ng/mL


 


Blood Type     


 


Antibody Screen     














  03/16/20 03/16/20 03/16/20 Range/Units





  19:08 19:08 21:08 


 


WBC     (4.0-11.0)  K/uL


 


RBC     (4.50-5.90)  M/uL


 


Hgb     (13.0-17.0)  g/dL


 


Hct     (38.0-50.0)  %


 


MCV     (80.0-98.0)  fL


 


MCH     (27.0-32.0)  pg


 


MCHC     (31.0-37.0)  g/dL


 


RDW Std Deviation     (28.0-62.0)  fl


 


RDW Coeff of Adan     (11.0-15.0)  %


 


Plt Count     (150-400)  K/uL


 


MPV     (7.40-12.00)  fL


 


Neut % (Auto)     (48.0-80.0)  %


 


Lymph % (Auto)     (16.0-40.0)  %


 


Mono % (Auto)     (0.0-15.0)  %


 


Eos % (Auto)     (0.0-7.0)  %


 


Baso % (Auto)     (0.0-1.5)  %


 


Neut # (Auto)     (1.4-5.7)  K/uL


 


Lymph # (Auto)     (0.6-2.4)  K/uL


 


Mono # (Auto)     (0.0-0.8)  K/uL


 


Eos # (Auto)     (0.0-0.7)  K/uL


 


Baso # (Auto)     (0.0-0.1)  K/uL


 


Nucleated RBC %     /100WBC


 


Nucleated RBCs #     K/uL


 


INR     


 


Lactate   5.8 H*   (0.20-2.00)  mmol/L


 


Sodium     (136-148)  mmol/L


 


Potassium     (3.5-5.1)  mmol/L


 


Chloride     ()  mmol/L


 


Carbon Dioxide     (21.0-32.0)  mmol/L


 


BUN     (7.0-18.0)  mg/dL


 


Creatinine     (0.8-1.3)  mg/dL


 


Est Cr Clr Drug Dosing     


 


Estimated GFR (MDRD)     ml/min


 


Glucose     ()  mg/dL


 


Calcium     (8.5-10.1)  mg/dL


 


Total Bilirubin     (0.2-1.0)  mg/dL


 


AST     (15-37)  IU/L


 


ALT     (14-63)  IU/L


 


Alkaline Phosphatase     ()  U/L


 


Troponin I     (0.000-0.056)  ng/mL


 


Total Protein     (6.4-8.2)  g/dL


 


Albumin     (3.4-5.0)  g/dL


 


Globulin     (2.6-4.0)  g/dL


 


Albumin/Globulin Ratio     (0.9-1.6)  


 


Lipase     ()  U/L


 


Free T4    1.23  (0.76-1.46)  ng/dL


 


TSH 3rd Generation     (0.36-3.74)  uIU/mL


 


Digoxin     (0.9-2.0)  ng/mL


 


Blood Type  O POSITIVE    


 


Antibody Screen  NEGATIVE    











Meds: 





Medications











Generic Name Dose Route Start Last Admin





  Trade Name Freq  PRN Reason Stop Dose Admin


 


Pantoprazole Sodium 80 mg/  100 mls @ 8 mls/hr  03/16/20 21:15  03/16/20 21:18





  Sodium Chloride  IV   8 mls/hr





  Q24H [facility]   Administration





     





     





     





     


 


Sodium Chloride  1,000 mls @ 125 mls/hr  03/16/20 23:30  





  Normal Saline  IV   





  ASDIRECTED [facility]   





     





     





     





     


 


Ondansetron HCl  4 mg  03/16/20 23:23  





  Zofran  IVPUSH   





  Q4H PRN   





  Nausea   





     





     





     














Discontinued Medications














Generic Name Dose Route Start Last Admin





  Trade Name Freq  PRN Reason Stop Dose Admin


 


Sodium Chloride  1,000 mls @ 1,000 mls/hr  03/16/20 19:13  03/16/20 19:26





  Normal Saline  IV  03/16/20 20:12  1,000 mls/hr





  .Bolus ONE   Administration





     





     





     





     


 


Sodium Chloride  1,000 mls @ 999 mls/hr  03/16/20 19:31  03/16/20 19:32





  Normal Saline  IV  03/16/20 20:31  999 mls/hr





  .Bolus ONE   Administration





     





     





     





     


 


Norepinephrine Bitartrate  Confirm  03/16/20 19:33  03/16/20 22:00





  Norepinephr-0.9% Nacl 4 Mg/250  Administered  03/16/20 19:34  Not Given





  Dose   





  4 mg in 250 mls @ as directed   





  IV   





  .STK-MED ONE   





     





     





     





     


 


Pantoprazole Sodium 80 mg/  20 mls @ 420 mls/hr  03/16/20 19:37  03/16/20 19:26





  Sodium Chloride  IVPUSH  03/16/20 19:39  420 mls/hr





  ONETIME ONE   Administration





     





     





     





     


 


Ceftriaxone Sodium/Dextrose 1  50 mls @ 100 mls/hr  03/16/20 21:08  03/16/20 21:

19





  gm/ Premix  IV  03/16/20 21:37  100 mls/hr





  ONETIME ONE   Administration





     





     





     





     


 


Ceftriaxone Sodium/Dextrose  Confirm  03/16/20 21:10  03/16/20 21:19





  Rocephin In Dextrose,Iso-Osm 1 Gm/50 Ml  Administered  03/16/20 21:11  Not 

Given





  Dose   





  50 mls @ as directed   





  .ROUTE   





  .STK-MED ONE   





     





     





     





     


 


Iopamidol  100 ml  03/17/20 06:01  03/17/20 06:04





  Isovue-370 (76%)  IVPUSH  03/17/20 06:02  100 ml





  ONETIME STA   Administration





     





     





     





     














Departure





- Departure


Time of Disposition: 21:25


Disposition: Admitted As Inpatient 66


Clinical Impression: 


 GI bleed, Hypotension








- Discharge Information





Sepsis Event Note





- Evaluation


Sepsis Screening Result: No Definite Risk





- Focused Exam


Vital Signs: 





 Vital Signs











  Temp Pulse Resp BP Pulse Ox


 


 03/16/20 21:00   114 H   89/52 L 


 


 03/16/20 20:00   103 H  20  108/52 L  95


 


 03/16/20 19:28   83  8 L  85/43 L 


 


 03/16/20 19:16  96.9 F  84  15  104/46 L  94 L











Date Exam was Performed: 03/17/20


Time Exam was Performed: 06:29





- My Orders


Last 24 Hours: 





My Active Orders





03/16/20 19:08


CULTURE BLOOD [BC] Stat 





03/16/20 19:10


EKG Documentation Completion [RC] STAT 





03/16/20 19:11


Gastrointestinal Tube Mgmt [RC] ASDIRECTED 


NG [Nasogastric Orogastric Tube Insertion] [OM.PC] Stat 





03/16/20 20:36


Blood Culture x2 Reflex Set [OM.PC] Stat 





03/16/20 21:15


Pantoprazole [ProTONIX IV***] 80 mg   Sodium Chloride 0.9% [Normal Saline] 100 

ml IV Q24H 





03/16/20 21:37


CULTURE BLOOD [BC] Stat 





03/16/20 23:04


UA RFX MAHENDRA AND CULT IF INDIC [URIN] Stat 














- Assessment/Plan


Last 24 Hours: 





My Active Orders





03/16/20 19:08


CULTURE BLOOD [BC] Stat 





03/16/20 19:10


EKG Documentation Completion [RC] STAT 





03/16/20 19:11


Gastrointestinal Tube Mgmt [RC] ASDIRECTED 


NG [Nasogastric Orogastric Tube Insertion] [OM.PC] Stat 





03/16/20 20:36


Blood Culture x2 Reflex Set [OM.PC] Stat 





03/16/20 21:15


Pantoprazole [ProTONIX IV***] 80 mg   Sodium Chloride 0.9% [Normal Saline] 100 

ml IV Q24H 





03/16/20 21:37


CULTURE BLOOD [BC] Stat 





03/16/20 23:04


UA RFX MAHENDRA AND CULT IF INDIC [URIN] Stat

## 2020-03-17 NOTE — CT
INDICATION :



GI bleed



TECHNIQUE :



CT Scan of the abdomen pelvis.



100 cc intravenous contrast



COMPARISON :



04/23/2016



FINDINGS:



Lung bases: Dependent atelectasis.



Scan quality: Images are markedly degraded with motion artifact.



Images of the abdomen after intravenous contrast were obtained in the late 

arterial and early portal venous phase.



GI tract:



Nasogastric tube in the stomach. No signs for asymmetrical wall thickening. 

No signs of obstruction.



Loops of left lower quadrant bowel within a left inguinal hernia. No 

transition point or sign for obvious obstruction. Focal segment of bowel 

within the hernia just proximal series 301, image 105 which is collapsed 

slightly prominent although this could be artifactual due to lack of 

distention.



Liver: No mass lesions or biliary dilatation gallbladder negative for 

calcified stones.



Spleen, adrenal glands, pancreas: No mass lesions. Extra renal pelvis in 

both kidneys with parapelvic cysts of the left kidney.



Lymph nodes: No pathologic retroperitoneal node enlargement.



Pelvis: Bladder diverticulum right bladder base measuring 2 cm. Enlarged 

central prostate gland with periurethral glandular median lobe at the 

bladder base. No adenopathy.



Miscellaneous: No ascites or free air.



Skeletal:



There are compression deformities of the T11 and T12 vertebral bodies. The 

T11 compression is new.



IMPRESSION:



1. Location or source of GI bleeding not definitely identified. 



2. Motion artifacts. 



3. Left inguinal hernia containing some nonobstructed bowel loops. 



4. Enlarged prostate with bladder diverticulum. 



5. New T11 compression fracture. 



6. Basilar atelectasis.



Please note that all CT scans at this facility use dose modulation, 

iterative reconstruction, and/or weight-based dosing when appropriate to 

reduce radiation dose to as low as reasonably achievable.



Dictated by Ap Schmidt MD @ Mar 17 2020  7:34AM



Signed by Dr. Ap Schmidt @ Mar 17 2020  7:43AM

## 2020-03-17 NOTE — PCM.PN
- General Info


Date of Service: 03/17/20





- Patient Data


Vitals - Most Recent: 


 Last Vital Signs











Temp  36.3 C   03/17/20 04:00


 


Pulse  105 H  03/17/20 04:00


 


Resp  21 H  03/17/20 04:00


 


BP  96/49 L  03/17/20 04:00


 


Pulse Ox  95   03/17/20 04:00











Weight - Most Recent: 175 kg


I&O - Last 24 Hours: 


 Intake & Output











 03/16/20 03/17/20 03/17/20





 22:59 06:59 14:59


 


Intake Total  63 


 


Output Total  20 


 


Balance  43 











Lab Results Last 24 Hours: 


 Laboratory Results - last 24 hr











  03/16/20 03/16/20 03/16/20 Range/Units





  19:08 19:08 19:08 


 


WBC  24.45 H    (4.0-11.0)  K/uL


 


RBC  3.59 L    (4.50-5.90)  M/uL


 


Hgb  10.2 L    (13.0-17.0)  g/dL


 


Hct  33.2 L    (38.0-50.0)  %


 


MCV  92.5    (80.0-98.0)  fL


 


MCH  28.4    (27.0-32.0)  pg


 


MCHC  30.7 L    (31.0-37.0)  g/dL


 


RDW Std Deviation  83.7 H    (28.0-62.0)  fl


 


RDW Coeff of Adan  25 H    (11.0-15.0)  %


 


Plt Count  1431 H    (150-400)  K/uL


 


MPV  9.80    (7.40-12.00)  fL


 


Neut % (Auto)  82.1 H    (48.0-80.0)  %


 


Lymph % (Auto)  9.2 L    (16.0-40.0)  %


 


Mono % (Auto)  7.8    (0.0-15.0)  %


 


Eos % (Auto)  0.4    (0.0-7.0)  %


 


Baso % (Auto)  0.5    (0.0-1.5)  %


 


Neut # (Auto)  20.1 H    (1.4-5.7)  K/uL


 


Lymph # (Auto)  2.3    (0.6-2.4)  K/uL


 


Mono # (Auto)  1.9 H    (0.0-0.8)  K/uL


 


Eos # (Auto)  0.1    (0.0-0.7)  K/uL


 


Baso # (Auto)  0.1    (0.0-0.1)  K/uL


 


Nucleated RBC %  0.6    /100WBC


 


Nucleated RBCs #  0    K/uL


 


INR    1.15  


 


Lactate     (0.20-2.00)  mmol/L


 


Sodium   145   (136-148)  mmol/L


 


Potassium   5.3 H   (3.5-5.1)  mmol/L


 


Chloride   107   ()  mmol/L


 


Carbon Dioxide   25.5   (21.0-32.0)  mmol/L


 


BUN   58 H   (7.0-18.0)  mg/dL


 


Creatinine   1.3   (0.8-1.3)  mg/dL


 


Est Cr Clr Drug Dosing   TNP   


 


Estimated GFR (MDRD)   52.1   ml/min


 


Glucose   179 H   ()  mg/dL


 


Calcium   9.4   (8.5-10.1)  mg/dL


 


Total Bilirubin   0.7   (0.2-1.0)  mg/dL


 


AST   23   (15-37)  IU/L


 


ALT   15   (14-63)  IU/L


 


Alkaline Phosphatase   76   ()  U/L


 


Troponin I   < 0.050   (0.000-0.056)  ng/mL


 


Total Protein   6.1 L   (6.4-8.2)  g/dL


 


Albumin   3.3 L   (3.4-5.0)  g/dL


 


Globulin   2.8   (2.6-4.0)  g/dL


 


Albumin/Globulin Ratio   1.2   (0.9-1.6)  


 


Lipase   599 H   ()  U/L


 


Free T4     (0.76-1.46)  ng/dL


 


TSH 3rd Generation   8.09 H   (0.36-3.74)  uIU/mL


 


Digoxin   0.1 L   (0.9-2.0)  ng/mL


 


Blood Type     


 


Antibody Screen     


 


Crossmatch     














  03/16/20 03/16/20 03/16/20 Range/Units





  19:08 19:08 21:08 


 


WBC     (4.0-11.0)  K/uL


 


RBC     (4.50-5.90)  M/uL


 


Hgb     (13.0-17.0)  g/dL


 


Hct     (38.0-50.0)  %


 


MCV     (80.0-98.0)  fL


 


MCH     (27.0-32.0)  pg


 


MCHC     (31.0-37.0)  g/dL


 


RDW Std Deviation     (28.0-62.0)  fl


 


RDW Coeff of Adan     (11.0-15.0)  %


 


Plt Count     (150-400)  K/uL


 


MPV     (7.40-12.00)  fL


 


Neut % (Auto)     (48.0-80.0)  %


 


Lymph % (Auto)     (16.0-40.0)  %


 


Mono % (Auto)     (0.0-15.0)  %


 


Eos % (Auto)     (0.0-7.0)  %


 


Baso % (Auto)     (0.0-1.5)  %


 


Neut # (Auto)     (1.4-5.7)  K/uL


 


Lymph # (Auto)     (0.6-2.4)  K/uL


 


Mono # (Auto)     (0.0-0.8)  K/uL


 


Eos # (Auto)     (0.0-0.7)  K/uL


 


Baso # (Auto)     (0.0-0.1)  K/uL


 


Nucleated RBC %     /100WBC


 


Nucleated RBCs #     K/uL


 


INR     


 


Lactate   5.8 H*   (0.20-2.00)  mmol/L


 


Sodium     (136-148)  mmol/L


 


Potassium     (3.5-5.1)  mmol/L


 


Chloride     ()  mmol/L


 


Carbon Dioxide     (21.0-32.0)  mmol/L


 


BUN     (7.0-18.0)  mg/dL


 


Creatinine     (0.8-1.3)  mg/dL


 


Est Cr Clr Drug Dosing     


 


Estimated GFR (MDRD)     ml/min


 


Glucose     ()  mg/dL


 


Calcium     (8.5-10.1)  mg/dL


 


Total Bilirubin     (0.2-1.0)  mg/dL


 


AST     (15-37)  IU/L


 


ALT     (14-63)  IU/L


 


Alkaline Phosphatase     ()  U/L


 


Troponin I     (0.000-0.056)  ng/mL


 


Total Protein     (6.4-8.2)  g/dL


 


Albumin     (3.4-5.0)  g/dL


 


Globulin     (2.6-4.0)  g/dL


 


Albumin/Globulin Ratio     (0.9-1.6)  


 


Lipase     ()  U/L


 


Free T4    1.23  (0.76-1.46)  ng/dL


 


TSH 3rd Generation     (0.36-3.74)  uIU/mL


 


Digoxin     (0.9-2.0)  ng/mL


 


Blood Type  O POSITIVE    


 


Antibody Screen  NEGATIVE    


 


Crossmatch  See Detail    














  03/16/20 03/16/20 03/17/20 Range/Units





  23:31 23:31 06:35 


 


WBC  29.54 H   23.30 H  (4.0-11.0)  K/uL


 


RBC  2.97 L   2.42 L  (4.50-5.90)  M/uL


 


Hgb  8.5 L   6.9 L  (13.0-17.0)  g/dL


 


Hct  27.0 L   21.9 L  (38.0-50.0)  %


 


MCV  90.9   90.5  (80.0-98.0)  fL


 


MCH  28.6   28.5  (27.0-32.0)  pg


 


MCHC  31.5   31.5  (31.0-37.0)  g/dL


 


RDW Std Deviation  82.4 H   82.2 H  (28.0-62.0)  fl


 


RDW Coeff of Adan  25 H   26 H  (11.0-15.0)  %


 


Plt Count  1215 H   1195 H  (150-400)  K/uL


 


MPV  9.60   9.30  (7.40-12.00)  fL


 


Neut % (Auto)  87.2 H   83.9 H  (48.0-80.0)  %


 


Lymph % (Auto)  6.2 L   9.4 L  (16.0-40.0)  %


 


Mono % (Auto)  6.1   6.1  (0.0-15.0)  %


 


Eos % (Auto)  0.2   0.3  (0.0-7.0)  %


 


Baso % (Auto)  0.3   0.3  (0.0-1.5)  %


 


Neut # (Auto)  25.8 H   19.6 H  (1.4-5.7)  K/uL


 


Lymph # (Auto)  1.8   2.2  (0.6-2.4)  K/uL


 


Mono # (Auto)  1.8 H   1.4 H  (0.0-0.8)  K/uL


 


Eos # (Auto)  0.1   0.1  (0.0-0.7)  K/uL


 


Baso # (Auto)  0.1   0.1  (0.0-0.1)  K/uL


 


Nucleated RBC %  0.4   0.5  /100WBC


 


Nucleated RBCs #  0   0  K/uL


 


INR     


 


Lactate   2.3 H*   (0.20-2.00)  mmol/L


 


Sodium     (136-148)  mmol/L


 


Potassium     (3.5-5.1)  mmol/L


 


Chloride     ()  mmol/L


 


Carbon Dioxide     (21.0-32.0)  mmol/L


 


BUN     (7.0-18.0)  mg/dL


 


Creatinine     (0.8-1.3)  mg/dL


 


Est Cr Clr Drug Dosing     


 


Estimated GFR (MDRD)     ml/min


 


Glucose     ()  mg/dL


 


Calcium     (8.5-10.1)  mg/dL


 


Total Bilirubin     (0.2-1.0)  mg/dL


 


AST     (15-37)  IU/L


 


ALT     (14-63)  IU/L


 


Alkaline Phosphatase     ()  U/L


 


Troponin I     (0.000-0.056)  ng/mL


 


Total Protein     (6.4-8.2)  g/dL


 


Albumin     (3.4-5.0)  g/dL


 


Globulin     (2.6-4.0)  g/dL


 


Albumin/Globulin Ratio     (0.9-1.6)  


 


Lipase     ()  U/L


 


Free T4     (0.76-1.46)  ng/dL


 


TSH 3rd Generation     (0.36-3.74)  uIU/mL


 


Digoxin     (0.9-2.0)  ng/mL


 


Blood Type     


 


Antibody Screen     


 


Crossmatch     














  03/17/20 03/17/20 Range/Units





  06:35 06:35 


 


WBC    (4.0-11.0)  K/uL


 


RBC    (4.50-5.90)  M/uL


 


Hgb    (13.0-17.0)  g/dL


 


Hct    (38.0-50.0)  %


 


MCV    (80.0-98.0)  fL


 


MCH    (27.0-32.0)  pg


 


MCHC    (31.0-37.0)  g/dL


 


RDW Std Deviation    (28.0-62.0)  fl


 


RDW Coeff of Adan    (11.0-15.0)  %


 


Plt Count    (150-400)  K/uL


 


MPV    (7.40-12.00)  fL


 


Neut % (Auto)    (48.0-80.0)  %


 


Lymph % (Auto)    (16.0-40.0)  %


 


Mono % (Auto)    (0.0-15.0)  %


 


Eos % (Auto)    (0.0-7.0)  %


 


Baso % (Auto)    (0.0-1.5)  %


 


Neut # (Auto)    (1.4-5.7)  K/uL


 


Lymph # (Auto)    (0.6-2.4)  K/uL


 


Mono # (Auto)    (0.0-0.8)  K/uL


 


Eos # (Auto)    (0.0-0.7)  K/uL


 


Baso # (Auto)    (0.0-0.1)  K/uL


 


Nucleated RBC %    /100WBC


 


Nucleated RBCs #    K/uL


 


INR    


 


Lactate  2.2 H*   (0.20-2.00)  mmol/L


 


Sodium   144  (136-148)  mmol/L


 


Potassium   5.2 H  (3.5-5.1)  mmol/L


 


Chloride   112 H  ()  mmol/L


 


Carbon Dioxide   23.2  (21.0-32.0)  mmol/L


 


BUN   58 H  (7.0-18.0)  mg/dL


 


Creatinine   1.3  (0.8-1.3)  mg/dL


 


Est Cr Clr Drug Dosing   40.56  


 


Estimated GFR (MDRD)   52.1  ml/min


 


Glucose   124 H  ()  mg/dL


 


Calcium   7.9 L  (8.5-10.1)  mg/dL


 


Total Bilirubin   0.5  (0.2-1.0)  mg/dL


 


AST   18  (15-37)  IU/L


 


ALT   12 L  (14-63)  IU/L


 


Alkaline Phosphatase   58  ()  U/L


 


Troponin I    (0.000-0.056)  ng/mL


 


Total Protein   4.9 L  (6.4-8.2)  g/dL


 


Albumin   2.6 L  (3.4-5.0)  g/dL


 


Globulin   2.3 L  (2.6-4.0)  g/dL


 


Albumin/Globulin Ratio   1.1  (0.9-1.6)  


 


Lipase    ()  U/L


 


Free T4    (0.76-1.46)  ng/dL


 


TSH 3rd Generation    (0.36-3.74)  uIU/mL


 


Digoxin    (0.9-2.0)  ng/mL


 


Blood Type    


 


Antibody Screen    


 


Crossmatch    











[place] Results Last 24 Hours: 


 Microbiology











 03/16/20 19:08 Anaerobic Blood Culture - Final





 Blood - Venous - Iv Start 











Med Orders - Current: 


 Current Medications





Pantoprazole Sodium 80 mg/ (Sodium Chloride)  100 mls @ 8 mls/hr IV Q24H [facility]


   Last Admin: 03/16/20 21:18 Dose:  8 mls/hr


Sodium Chloride (Normal Saline)  1,000 mls @ 125 mls/hr IV ASDIRECTED [facility]


Ceftriaxone Sodium 1 gm/ (Sodium Chloride)  50 mls @ 100 mls/hr IV Q24H [facility]


Ondansetron HCl (Zofran)  4 mg IVPUSH Q4H PRN


   PRN Reason: Nausea





Discontinued Medications





Sodium Chloride (Normal Saline)  1,000 mls @ 1,000 mls/hr IV .Bolus ONE


   Stop: 03/16/20 20:12


   Last Admin: 03/16/20 19:26 Dose:  1,000 mls/hr


Sodium Chloride (Normal Saline)  1,000 mls @ 999 mls/hr IV .Bolus ONE


   Stop: 03/16/20 20:31


   Last Admin: 03/16/20 19:32 Dose:  999 mls/hr


Norepinephrine Bitartrate (Norepinephr-0.9% Nacl 4 Mg/250) Confirm Administered 

Dose 4 mg in 250 mls @ as directed IV .STK-MED ONE


   Stop: 03/16/20 19:34


   Last Admin: 03/16/20 22:00 Dose:  Not Given


Pantoprazole Sodium 80 mg/ (Sodium Chloride)  20 mls @ 420 mls/hr IVPUSH 

ONETIME ONE


   Stop: 03/16/20 19:39


   Last Admin: 03/16/20 19:26 Dose:  420 mls/hr


Ceftriaxone Sodium/Dextrose 1 (gm/ Premix)  50 mls @ 100 mls/hr IV ONETIME ONE


   Stop: 03/16/20 21:37


   Last Admin: 03/16/20 21:19 Dose:  100 mls/hr


Ceftriaxone Sodium/Dextrose (Rocephin In Dextrose,Iso-Osm 1 Gm/50 Ml) Confirm 

Administered Dose 50 mls @ as directed .ROUTE .STK-MED ONE


   Stop: 03/16/20 21:11


   Last Admin: 03/16/20 21:19 Dose:  Not Given


Iopamidol (Isovue-370 (76%))  100 ml IVPUSH ONETIME STA


   Stop: 03/17/20 06:02


   Last Admin: 03/17/20 06:04 Dose:  100 ml











- Exam


General: Alert


Lungs: Clear to Auscultation, Normal Respiratory Effort


Cardiovascular: Regular Rate, Regular Rhythm


GI/Abdominal Exam: Normal Bowel Sounds, Soft, Non-Tender


Extremities: Non-Tender, No Pedal Edema


Skin: Warm, Dry, Intact


Neurological: No New Focal Deficit





Sepsis Event Note





- Evaluation


Sepsis Screening Result: No Definite Risk





- Focused Exam


Vital Signs: 


 Vital Signs











  Temp Temp Pulse Resp BP Pulse Ox


 


 03/17/20 04:00  36.3 C   105 H  21 H  96/49 L  95


 


 03/17/20 00:34   36.6 C  112 H  24 H  110/60  97


 


 03/16/20 23:23   37.7 C  102 H  23 H  116/49 L  95


 


 03/16/20 22:11    106 H   99/49 L 


 


 03/16/20 21:47    111 H   93/47 L 


 


 03/16/20 21:00    114 H   89/52 L 


 


 03/16/20 20:00    103 H  20  108/52 L  95











Date Exam was Performed: 03/17/20


Time Exam was Performed: 07:39





- Problem List Review


Problem List Initiated/Reviewed/Updated: Yes





- My Orders


Last 24 Hours: 


My Active Orders





03/16/20 23:22


Abdomen Pelvis w wo Cont [CT] Routine 





03/16/20 23:23


Oxygen Therapy [RC] PRN 


Up ad Carol [RC] ASDIRECTED 


VTE/DVT Education [RC] Q12H 


Vital Signs [RC] Q4H 


Ondansetron [Zofran]   4 mg IVPUSH Q4H PRN 


Sequential Compression Device [OM.PC] Per Unit Routine 


Resuscitation Status Routine 





03/16/20 23:24


Antiembolic Devices [RC] PER UNIT ROUTINE 





03/16/20 23:30


Sodium Chloride 0.9% [Normal Saline] 1,000 ml IV ASDIRECTED 





03/17/20 07:37


RED BLOOD CELLS LP [BBK] Routine 


Transfuse Red Blood Cells [COMM] Routine 





03/17/20 19:00


cefTRIAXone [Rocephin] 1 gm   Sodium Chloride 0.9% [Normal Saline] 50 ml IV 

Q24H 














- Plan


Plan:: 





87 yo male admitted for acute GI bleed.  We will continue IV protonix, fluid 

resuscitation and bowel rest.  Hgb droped to 6.9 will transfuse two units.  

Patient is on Rocephin until sepsis ruled out.  CT scan of the abdomen and 

pelvis ordered.  Patient is a DNR/DNI.

## 2020-03-17 NOTE — PCM.SN
- Free Text/Narrative


Note: 





Spoke with family and patient this afternoon.  They are wanting to wait and see 

if GI bleeds slows down. May decide tomorrow if patient should be transferred.

## 2020-03-18 VITALS — SYSTOLIC BLOOD PRESSURE: 93 MMHG | DIASTOLIC BLOOD PRESSURE: 58 MMHG | HEART RATE: 89 BPM

## 2020-03-18 LAB
BUN SERPL-MCNC: 66 MG/DL (ref 7–18)
CHLORIDE SERPL-SCNC: 114 MMOL/L (ref 98–107)
CO2 SERPL-SCNC: 19.2 MMOL/L (ref 21–32)
GLUCOSE SERPL-MCNC: 170 MG/DL (ref 74–106)
POTASSIUM SERPL-SCNC: 4.8 MMOL/L (ref 3.5–5.1)
SODIUM SERPL-SCNC: 147 MMOL/L (ref 136–148)

## 2020-03-18 RX ADMIN — SODIUM CHLORIDE SCH MLS/HR: 9 INJECTION, SOLUTION INTRAMUSCULAR; INTRAVENOUS; SUBCUTANEOUS at 03:27

## 2020-03-18 NOTE — [email or web address]
**Discharge Summary





- Hospital Course


Brief History: 89 yo male who presented with one day history of nausea and 

vomiting.  He also became lightheaded and reported generalized weakness.  

Family reports bloody emesis and blood in his stool.  Patient denies any 

abdominal pain or history of GI bleed.  HE denies any NSAID use or 

anticoagulation.  He does have a history of SVT.  His BP on admission was 84/43 

HR of 103.  In the ED he was noted to have a WBC of 24,000 and lactic acid of 

5.8.  PAtient did not want transfer or referral for endoscopy for what is 

suspected to be acute GI bleed.





- Discharge Data


Discharge Date: 20


Discharge Disposition:  20


Condition: 





- Referral to Home Health


Primary Care Physician: 


PCP None








- Discharge Diagnosis/Problem(s)


(1) Palliative care status


SNOMED Code(s): 248487575


   ICD Code: Z51.5 - ENCOUNTER FOR PALLIATIVE CARE   Status: Acute   Current 

Visit: Yes   





(2) GI bleed


SNOMED Code(s): 62770654


   ICD Code: K92.2 - GASTROINTESTINAL HEMORRHAGE, UNSPECIFIED   Status: Acute   

Current Visit: Yes   





(3) Hypotension


SNOMED Code(s): 90630487


   ICD Code: I95.9 - HYPOTENSION, UNSPECIFIED   Status: Acute   Current Visit: 

Yes   





- Discharge Plan


Home Medications: 


 Home Meds





Digoxin 0.25 mg PO DAILY 16 [History]


Tamsulosin [Flomax] 0.4 mg PO ONETIME #10 cap.er 01/15/17 [Rx]


Finasteride 5 mg PO ASDIRECTED 19 [History]


Hydroxyurea [Hydrea] 500 mg PO ASDIRECTED 19 [History]


Pantoprazole Sodium 40 mg PO DAILY 19 [History]


Aspirin 81 mg PO DAILY 10/31/19 [History]








Forms:  ED Department Discharge


Referrals: 


PCP,None [Primary Care Provider] - 





- Discharge Summary/Plan Comment


DC Time >30 min.: No


Discharge Summary/Plan Comment: 





Cause of Death:





GI bleed with acute blood loss





Admitting diagnoses:





Acute GI bleed


Leukocytosis


Palliative care





Other PMH:





Atrial tachycardia


Hx GI bleeding


Hypothyroidism


[place]








Never a smoker








Mukul was admitted secondary to acute GI bleed, patient and family at that 

time declined transfer. They wanted to see if things improved. hgb dropped to 

6.9 on second day of admission. Family was ok with blood transfusion. Patient 

again continued to decline transfer, remained DNR/DNR. This morning, patient 

had large blood BM and was noted to have hgb 5.8 with hemodynamic changes, 

tachycardia and hypotension. Family called and notified of poor prognosis. They 

continued to decline transfer. IV access was lost and they requested no further 

IV access as nursing could not place next step would be invasive central line. 

He was transitioned to palliative care, Morphine and Ativan available for pain 

or agitation. Patient remained very lethargic during the day. At 1458 with 

family at bedside patient was noted to have no respiratory effort or cardiac 

activity. He was pronounced death 1458. family had no questions or concerns. Dr alonzo notified as well. 








- Patient Data


Vitals - Most Recent: 


 Last Vital Signs











Temp  97.5 F   20 12:00


 


Pulse  89   20 12:00


 


Resp  16   20 12:00


 


BP  93/58 L  20 12:00


 


Pulse Ox  90 L  20 12:00











Weight - Most Recent: 175 kg


I&O - Last 24 hours: 


 Intake & Output











 20





 06:59 14:59 22:59


 


Intake Total 1189  


 


Output Total 27  


 


Balance 1162  











Lab Results - Last 24 hrs: 


 Laboratory Results - last 24 hr











  20 Range/Units





  19:08 16:09 16:09 


 


WBC   19.38 H   (4.0-11.0)  K/uL


 


RBC   3.07 L   (4.50-5.90)  M/uL


 


Hgb   8.7 L   (13.0-17.0)  g/dL


 


Hct   26.6 L   (38.0-50.0)  %


 


MCV   86.6   (80.0-98.0)  fL


 


MCH   28.3   (27.0-32.0)  pg


 


MCHC   32.7   (31.0-37.0)  g/dL


 


RDW Std Deviation   67.1 H   (28.0-62.0)  fl


 


RDW Coeff of Adan   22 H   (11.0-15.0)  %


 


Plt Count   1055 H   (150-400)  K/uL


 


MPV   9.30   (7.40-12.00)  fL


 


Neut % (Auto)   81.2 H   (48.0-80.0)  %


 


Lymph % (Auto)   10.1 L   (16.0-40.0)  %


 


Mono % (Auto)   8.1   (0.0-15.0)  %


 


Eos % (Auto)   0.3   (0.0-7.0)  %


 


Baso % (Auto)   0.3   (0.0-1.5)  %


 


Neut # (Auto)   15.8 H   (1.4-5.7)  K/uL


 


Lymph # (Auto)   2.0   (0.6-2.4)  K/uL


 


Mono # (Auto)   1.6 H   (0.0-0.8)  K/uL


 


Eos # (Auto)   0.1   (0.0-0.7)  K/uL


 


Baso # (Auto)   0.1   (0.0-0.1)  K/uL


 


Nucleated RBC %   0.9   /100WBC


 


Nucleated RBCs #   0   K/uL


 


Lactate    1.8  (0.20-2.00)  mmol/L


 


Sodium     (136-148)  mmol/L


 


Potassium     (3.5-5.1)  mmol/L


 


Chloride     ()  mmol/L


 


Carbon Dioxide     (21.0-32.0)  mmol/L


 


BUN     (7.0-18.0)  mg/dL


 


Creatinine     (0.8-1.3)  mg/dL


 


Est Cr Clr Drug Dosing     mL/min


 


Estimated GFR (MDRD)     ml/min


 


Glucose     ()  mg/dL


 


Calcium     (8.5-10.1)  mg/dL


 


Urine Color     


 


Urine Appearance     


 


Urine pH     (5.0-8.0)  


 


Ur Specific Gravity     (1.001-1.035)  


 


Urine Protein     (NEGATIVE)  mg/dL


 


Urine Glucose (UA)     (NEGATIVE)  mg/dL


 


Urine Ketones     (NEGATIVE)  mg/dL


 


Urine Occult Blood     (NEGATIVE)  


 


Urine Nitrite     (NEGATIVE)  


 


Urine Bilirubin     (NEGATIVE)  


 


Urine Urobilinogen     (<2.0)  EU/dL


 


Ur Leukocyte Esterase     (NEGATIVE)  


 


Urine RBC     (0-2/HPF)  


 


Urine WBC     (0-5/HPF)  


 


Ur Epithelial Cells     (NONE-FEW)  


 


Urine Bacteria     (NEGATIVE)  


 


Blood Type  O POSITIVE    


 


Antibody Screen  NEGATIVE    


 


Crossmatch  See Detail    














  20 Range/Units





  17:10 08:15 08:15 


 


WBC   29.39 H   (4.0-11.0)  K/uL


 


RBC   2.07 L   (4.50-5.90)  M/uL


 


Hgb   5.8 L   (13.0-17.0)  g/dL


 


Hct   18.2 L   (38.0-50.0)  %


 


MCV   87.9   (80.0-98.0)  fL


 


MCH   28.0   (27.0-32.0)  pg


 


MCHC   31.9   (31.0-37.0)  g/dL


 


RDW Std Deviation   71.5 H   (28.0-62.0)  fl


 


RDW Coeff of Adna   24 H   (11.0-15.0)  %


 


Plt Count   1185 H   (150-400)  K/uL


 


MPV   10.00   (7.40-12.00)  fL


 


Neut % (Auto)   80.2 H   (48.0-80.0)  %


 


Lymph % (Auto)   13.1 L   (16.0-40.0)  %


 


Mono % (Auto)   6.2   (0.0-15.0)  %


 


Eos % (Auto)   0.1   (0.0-7.0)  %


 


Baso % (Auto)   0.4   (0.0-1.5)  %


 


Neut # (Auto)   23.6 H   (1.4-5.7)  K/uL


 


Lymph # (Auto)   3.9 H   (0.6-2.4)  K/uL


 


Mono # (Auto)   1.8 H   (0.0-0.8)  K/uL


 


Eos # (Auto)   0.0   (0.0-0.7)  K/uL


 


Baso # (Auto)   0.1   (0.0-0.1)  K/uL


 


Nucleated RBC %   1.3   /100WBC


 


Nucleated RBCs #   0   K/uL


 


Lactate     (0.20-2.00)  mmol/L


 


Sodium    147  (136-148)  mmol/L


 


Potassium    4.8  (3.5-5.1)  mmol/L


 


Chloride    114 H  ()  mmol/L


 


Carbon Dioxide    19.2 L  (21.0-32.0)  mmol/L


 


BUN    66 H  (7.0-18.0)  mg/dL


 


Creatinine    1.7 H  (0.8-1.3)  mg/dL


 


Est Cr Clr Drug Dosing    31.01  mL/min


 


Estimated GFR (MDRD)    38.2  ml/min


 


Glucose    170 H  ()  mg/dL


 


Calcium    7.7 L  (8.5-10.1)  mg/dL


 


Urine Color  YELLOW    


 


Urine Appearance  CLEAR    


 


Urine pH  5.5    (5.0-8.0)  


 


Ur Specific Gravity  1.020    (1.001-1.035)  


 


Urine Protein  NEGATIVE    (NEGATIVE)  mg/dL


 


Urine Glucose (UA)  NEGATIVE    (NEGATIVE)  mg/dL


 


Urine Ketones  NEGATIVE    (NEGATIVE)  mg/dL


 


Urine Occult Blood  NEGATIVE    (NEGATIVE)  


 


Urine Nitrite  NEGATIVE    (NEGATIVE)  


 


Urine Bilirubin  NEGATIVE    (NEGATIVE)  


 


Urine Urobilinogen  0.2    (<2.0)  EU/dL


 


Ur Leukocyte Esterase  NEGATIVE    (NEGATIVE)  


 


Urine RBC  0-1    (0-2/HPF)  


 


Urine WBC  0-1    (0-5/HPF)  


 


Ur Epithelial Cells  RARE    (NONE-FEW)  


 


Urine Bacteria  RARE    (NEGATIVE)  


 


Blood Type     


 


Antibody Screen     


 


Crossmatch     











MAHENDRA Results - Last 24 hrs: 


 Microbiology











 20 21:37 Aerobic Blood Culture - Preliminary





 Blood - Venous - Lab Draw    NO GROWTH AFTER 1 DAY





 Anaerobic Blood Culture - Preliminary





    NO GROWTH AFTER 1 DAY


 


 20 19:08 Aerobic Blood Culture - Preliminary





 Blood - Venous - Iv Start    NO GROWTH AFTER 1 DAY





 Anaerobic Blood Culture - Final











Med Orders - Current: 


 Current Medications





Ceftriaxone Sodium 1 gm/ (Sodium Chloride)  50 mls @ 100 mls/hr IV Q24H [facility]


   Last Admin: 20 18:20 Dose:  100 mls/hr


Pantoprazole Sodium 40 mg/ (Sodium Chloride)  10 mls @ 300 mls/hr IV Q12H [facility]


   Last Admin: 20 03:27 Dose:  300 mls/hr


Lorazepam (Ativan)  1 mg PO Q4H PRN


   PRN Reason: agitation/anxiety


Morphine Sulfate (Morphine 20 Mg/Ml Soln)  5 mg SL Q1H PRN


   PRN Reason: pain/SOB/agitation


   Last Admin: 20 14:58 Dose:  0.25 ml


Ondansetron HCl (Zofran)  4 mg IVPUSH Q4H PRN


   PRN Reason: Nausea





Discontinued Medications





Sodium Chloride (Normal Saline)  1,000 mls @ 1,000 mls/hr IV .Bolus ONE


   Stop: 20 20:12


   Last Admin: 20 19:26 Dose:  1,000 mls/hr


Sodium Chloride (Normal Saline)  1,000 mls @ 999 mls/hr IV .Bolus ONE


   Stop: 20 20:31


   Last Admin: 20 19:32 Dose:  999 mls/hr


Norepinephrine Bitartrate (Norepinephr-0.9% Nacl 4 Mg/250) Confirm Administered 

Dose 4 mg in 250 mls @ as directed IV .STK-MED ONE


   Stop: 20 19:34


   Last Admin: 20 22:00 Dose:  Not Given


Pantoprazole Sodium 80 mg/ (Sodium Chloride)  20 mls @ 420 mls/hr IVPUSH 

ONETIME ONE


   Stop: 20 19:39


   Last Admin: 20 19:26 Dose:  420 mls/hr


Pantoprazole Sodium 80 mg/ (Sodium Chloride)  100 mls @ 8 mls/hr IV Q24H [facility]


   Last Admin: 20 21:18 Dose:  8 mls/hr


Ceftriaxone Sodium/Dextrose 1 (gm/ Premix)  50 mls @ 100 mls/hr IV ONETIME ONE


   Stop: 20 21:37


   Last Admin: 20 21:19 Dose:  100 mls/hr


Ceftriaxone Sodium/Dextrose (Rocephin In Dextrose,Iso-Osm 1 Gm/50 Ml) Confirm 

Administered Dose 50 mls @ as directed .ROUTE .STK-MED ONE


   Stop: 20 21:11


   Last Admin: 20 21:19 Dose:  Not Given


Sodium Chloride (Normal Saline)  1,000 mls @ 125 mls/hr IV ASDIRECTED [facility]


   Last Admin: 20 01:16 Dose:  125 mls/hr


Iopamidol (Isovue-370 (76%))  100 ml IVPUSH ONETIME STA


   Stop: 20 06:02


   Last Admin: 20 06:04 Dose:  100 ml


Lorazepam (Ativan)  1 mg IVPUSH Q4H PRN


   PRN Reason: agitation/anxiety

## 2020-03-18 NOTE — PCM.PN
- General Info


Date of Service: 03/18/20


Admission Dx/Problem (Free Text): 


 Admission Diagnosis/Problem





Admission Diagnosis/Problem      GI bleed 








Subjective Update: 





Had large flor blood BM this morningper nursing. Patient is lethargic and 

responding. Reports some back pain. no other complaints. Feels weak. 


Functional Status: Reports: Pain Controlled.  Denies: Ambulating





- Review of Systems


Pulmonary: Denies: Shortness of Breath


Cardiovascular: Reports: No Symptoms.  Denies: Chest Pain


Gastrointestinal: Reports: Other (BRB per rectum).  Denies: Abdominal Pain, 

Nausea


Genitourinary: Reports: No Symptoms.  Denies: Dysuria, Frequency, Burning


Skin: Reports: No Symptoms


Neurological: Reports: No Symptoms


Psychiatric: Reports: No Symptoms





- Patient Data


Vitals - Most Recent: 


 Last Vital Signs











Temp  98.5 F   03/18/20 08:00


 


Pulse  120 H  03/18/20 08:00


 


Resp  20   03/18/20 08:00


 


BP  132/63   03/18/20 08:00


 


Pulse Ox  98   03/18/20 08:00











Weight - Most Recent: 175 kg


I&O - Last 24 Hours: 


 Intake & Output











 03/17/20 03/18/20 03/18/20





 22:59 06:59 14:59


 


Intake Total 220 1189 


 


Output Total 80 27 


 


Balance 140 1162 











Lab Results Last 24 Hours: 


 Laboratory Results - last 24 hr











  03/16/20 03/17/20 03/17/20 Range/Units





  19:08 11:15 16:09 


 


WBC    19.38 H  (4.0-11.0)  K/uL


 


RBC    3.07 L  (4.50-5.90)  M/uL


 


Hgb    8.7 L  (13.0-17.0)  g/dL


 


Hct    26.6 L  (38.0-50.0)  %


 


MCV    86.6  (80.0-98.0)  fL


 


MCH    28.3  (27.0-32.0)  pg


 


MCHC    32.7  (31.0-37.0)  g/dL


 


RDW Std Deviation    67.1 H  (28.0-62.0)  fl


 


RDW Coeff of Adan    22 H  (11.0-15.0)  %


 


Plt Count    1055 H  (150-400)  K/uL


 


MPV    9.30  (7.40-12.00)  fL


 


Neut % (Auto)    81.2 H  (48.0-80.0)  %


 


Lymph % (Auto)    10.1 L  (16.0-40.0)  %


 


Mono % (Auto)    8.1  (0.0-15.0)  %


 


Eos % (Auto)    0.3  (0.0-7.0)  %


 


Baso % (Auto)    0.3  (0.0-1.5)  %


 


Neut # (Auto)    15.8 H  (1.4-5.7)  K/uL


 


Lymph # (Auto)    2.0  (0.6-2.4)  K/uL


 


Mono # (Auto)    1.6 H  (0.0-0.8)  K/uL


 


Eos # (Auto)    0.1  (0.0-0.7)  K/uL


 


Baso # (Auto)    0.1  (0.0-0.1)  K/uL


 


Nucleated RBC %    0.9  /100WBC


 


Nucleated RBCs #    0  K/uL


 


Lactate   2.8 H*   (0.20-2.00)  mmol/L


 


Sodium     (136-148)  mmol/L


 


Potassium     (3.5-5.1)  mmol/L


 


Chloride     ()  mmol/L


 


Carbon Dioxide     (21.0-32.0)  mmol/L


 


BUN     (7.0-18.0)  mg/dL


 


Creatinine     (0.8-1.3)  mg/dL


 


Est Cr Clr Drug Dosing     mL/min


 


Estimated GFR (MDRD)     ml/min


 


Glucose     ()  mg/dL


 


Calcium     (8.5-10.1)  mg/dL


 


Urine Color     


 


Urine Appearance     


 


Urine pH     (5.0-8.0)  


 


Ur Specific Gravity     (1.001-1.035)  


 


Urine Protein     (NEGATIVE)  mg/dL


 


Urine Glucose (UA)     (NEGATIVE)  mg/dL


 


Urine Ketones     (NEGATIVE)  mg/dL


 


Urine Occult Blood     (NEGATIVE)  


 


Urine Nitrite     (NEGATIVE)  


 


Urine Bilirubin     (NEGATIVE)  


 


Urine Urobilinogen     (<2.0)  EU/dL


 


Ur Leukocyte Esterase     (NEGATIVE)  


 


Urine RBC     (0-2/HPF)  


 


Urine WBC     (0-5/HPF)  


 


Ur Epithelial Cells     (NONE-FEW)  


 


Urine Bacteria     (NEGATIVE)  


 


Blood Type  O POSITIVE    


 


Antibody Screen  NEGATIVE    


 


Crossmatch  See Detail    














  03/17/20 03/17/20 03/18/20 Range/Units





  16:09 17:10 08:15 


 


WBC    29.39 H  (4.0-11.0)  K/uL


 


RBC    2.07 L  (4.50-5.90)  M/uL


 


Hgb    5.8 L  (13.0-17.0)  g/dL


 


Hct    18.2 L  (38.0-50.0)  %


 


MCV    87.9  (80.0-98.0)  fL


 


MCH    28.0  (27.0-32.0)  pg


 


MCHC    31.9  (31.0-37.0)  g/dL


 


RDW Std Deviation    71.5 H  (28.0-62.0)  fl


 


RDW Coeff of Adan    24 H  (11.0-15.0)  %


 


Plt Count    1185 H  (150-400)  K/uL


 


MPV    10.00  (7.40-12.00)  fL


 


Neut % (Auto)    80.2 H  (48.0-80.0)  %


 


Lymph % (Auto)    13.1 L  (16.0-40.0)  %


 


Mono % (Auto)    6.2  (0.0-15.0)  %


 


Eos % (Auto)    0.1  (0.0-7.0)  %


 


Baso % (Auto)    0.4  (0.0-1.5)  %


 


Neut # (Auto)    23.6 H  (1.4-5.7)  K/uL


 


Lymph # (Auto)    3.9 H  (0.6-2.4)  K/uL


 


Mono # (Auto)    1.8 H  (0.0-0.8)  K/uL


 


Eos # (Auto)    0.0  (0.0-0.7)  K/uL


 


Baso # (Auto)    0.1  (0.0-0.1)  K/uL


 


Nucleated RBC %    1.3  /100WBC


 


Nucleated RBCs #    0  K/uL


 


Lactate  1.8    (0.20-2.00)  mmol/L


 


Sodium     (136-148)  mmol/L


 


Potassium     (3.5-5.1)  mmol/L


 


Chloride     ()  mmol/L


 


Carbon Dioxide     (21.0-32.0)  mmol/L


 


BUN     (7.0-18.0)  mg/dL


 


Creatinine     (0.8-1.3)  mg/dL


 


Est Cr Clr Drug Dosing     mL/min


 


Estimated GFR (MDRD)     ml/min


 


Glucose     ()  mg/dL


 


Calcium     (8.5-10.1)  mg/dL


 


Urine Color   YELLOW   


 


Urine Appearance   CLEAR   


 


Urine pH   5.5   (5.0-8.0)  


 


Ur Specific Gravity   1.020   (1.001-1.035)  


 


Urine Protein   NEGATIVE   (NEGATIVE)  mg/dL


 


Urine Glucose (UA)   NEGATIVE   (NEGATIVE)  mg/dL


 


Urine Ketones   NEGATIVE   (NEGATIVE)  mg/dL


 


Urine Occult Blood   NEGATIVE   (NEGATIVE)  


 


Urine Nitrite   NEGATIVE   (NEGATIVE)  


 


Urine Bilirubin   NEGATIVE   (NEGATIVE)  


 


Urine Urobilinogen   0.2   (<2.0)  EU/dL


 


Ur Leukocyte Esterase   NEGATIVE   (NEGATIVE)  


 


Urine RBC   0-1   (0-2/HPF)  


 


Urine WBC   0-1   (0-5/HPF)  


 


Ur Epithelial Cells   RARE   (NONE-FEW)  


 


Urine Bacteria   RARE   (NEGATIVE)  


 


Blood Type     


 


Antibody Screen     


 


Crossmatch     














  03/18/20 Range/Units





  08:15 


 


WBC   (4.0-11.0)  K/uL


 


RBC   (4.50-5.90)  M/uL


 


Hgb   (13.0-17.0)  g/dL


 


Hct   (38.0-50.0)  %


 


MCV   (80.0-98.0)  fL


 


MCH   (27.0-32.0)  pg


 


MCHC   (31.0-37.0)  g/dL


 


RDW Std Deviation   (28.0-62.0)  fl


 


RDW Coeff of Adan   (11.0-15.0)  %


 


Plt Count   (150-400)  K/uL


 


MPV   (7.40-12.00)  fL


 


Neut % (Auto)   (48.0-80.0)  %


 


Lymph % (Auto)   (16.0-40.0)  %


 


Mono % (Auto)   (0.0-15.0)  %


 


Eos % (Auto)   (0.0-7.0)  %


 


Baso % (Auto)   (0.0-1.5)  %


 


Neut # (Auto)   (1.4-5.7)  K/uL


 


Lymph # (Auto)   (0.6-2.4)  K/uL


 


Mono # (Auto)   (0.0-0.8)  K/uL


 


Eos # (Auto)   (0.0-0.7)  K/uL


 


Baso # (Auto)   (0.0-0.1)  K/uL


 


Nucleated RBC %   /100WBC


 


Nucleated RBCs #   K/uL


 


Lactate   (0.20-2.00)  mmol/L


 


Sodium  147  (136-148)  mmol/L


 


Potassium  4.8  (3.5-5.1)  mmol/L


 


Chloride  114 H  ()  mmol/L


 


Carbon Dioxide  19.2 L  (21.0-32.0)  mmol/L


 


BUN  66 H  (7.0-18.0)  mg/dL


 


Creatinine  1.7 H  (0.8-1.3)  mg/dL


 


Est Cr Clr Drug Dosing  31.01  mL/min


 


Estimated GFR (MDRD)  38.2  ml/min


 


Glucose  170 H  ()  mg/dL


 


Calcium  7.7 L  (8.5-10.1)  mg/dL


 


Urine Color   


 


Urine Appearance   


 


Urine pH   (5.0-8.0)  


 


Ur Specific Gravity   (1.001-1.035)  


 


Urine Protein   (NEGATIVE)  mg/dL


 


Urine Glucose (UA)   (NEGATIVE)  mg/dL


 


Urine Ketones   (NEGATIVE)  mg/dL


 


Urine Occult Blood   (NEGATIVE)  


 


Urine Nitrite   (NEGATIVE)  


 


Urine Bilirubin   (NEGATIVE)  


 


Urine Urobilinogen   (<2.0)  EU/dL


 


Ur Leukocyte Esterase   (NEGATIVE)  


 


Urine RBC   (0-2/HPF)  


 


Urine WBC   (0-5/HPF)  


 


Ur Epithelial Cells   (NONE-FEW)  


 


Urine Bacteria   (NEGATIVE)  


 


Blood Type   


 


Antibody Screen   


 


Crossmatch   











[facility] Results Last 24 Hours: 


 Microbiology











 03/16/20 21:37 Aerobic Blood Culture - Preliminary





 Blood - Venous - Lab Draw    NO GROWTH AFTER 1 DAY





 Anaerobic Blood Culture - Preliminary





    NO GROWTH AFTER 1 DAY


 


 03/16/20 19:08 Aerobic Blood Culture - Preliminary





 Blood - Venous - Iv Start    NO GROWTH AFTER 1 DAY





 Anaerobic Blood Culture - Final











Med Orders - Current: 


 Current Medications





Sodium Chloride (Normal Saline)  1,000 mls @ 125 mls/hr IV ASDIRECTED [facility]


   Last Admin: 03/18/20 01:16 Dose:  125 mls/hr


Ceftriaxone Sodium 1 gm/ (Sodium Chloride)  50 mls @ 100 mls/hr IV Q24H [facility]


   Last Admin: 03/17/20 18:20 Dose:  100 mls/hr


Pantoprazole Sodium 40 mg/ (Sodium Chloride)  10 mls @ 300 mls/hr IV Q12H [facility]


   Last Admin: 03/18/20 03:27 Dose:  300 mls/hr


Ondansetron HCl (Zofran)  4 mg IVPUSH Q4H PRN


   PRN Reason: Nausea





Discontinued Medications





Sodium Chloride (Normal Saline)  1,000 mls @ 1,000 mls/hr IV .Bolus ONE


   Stop: 03/16/20 20:12


   Last Admin: 03/16/20 19:26 Dose:  1,000 mls/hr


Sodium Chloride (Normal Saline)  1,000 mls @ 999 mls/hr IV .Bolus ONE


   Stop: 03/16/20 20:31


   Last Admin: 03/16/20 19:32 Dose:  999 mls/hr


Norepinephrine Bitartrate (Norepinephr-0.9% Nacl 4 Mg/250) Confirm Administered 

Dose 4 mg in 250 mls @ as directed IV .STK-MED ONE


   Stop: 03/16/20 19:34


   Last Admin: 03/16/20 22:00 Dose:  Not Given


Pantoprazole Sodium 80 mg/ (Sodium Chloride)  20 mls @ 420 mls/hr IVPUSH 

ONETIME ONE


   Stop: 03/16/20 19:39


   Last Admin: 03/16/20 19:26 Dose:  420 mls/hr


Pantoprazole Sodium 80 mg/ (Sodium Chloride)  100 mls @ 8 mls/hr IV Q24H [facility]


   Last Admin: 03/16/20 21:18 Dose:  8 mls/hr


Ceftriaxone Sodium/Dextrose 1 (gm/ Premix)  50 mls @ 100 mls/hr IV ONETIME ONE


   Stop: 03/16/20 21:37


   Last Admin: 03/16/20 21:19 Dose:  100 mls/hr


Ceftriaxone Sodium/Dextrose (Rocephin In Dextrose,Iso-Osm 1 Gm/50 Ml) Confirm 

Administered Dose 50 mls @ as directed .ROUTE .STK-MED ONE


   Stop: 03/16/20 21:11


   Last Admin: 03/16/20 21:19 Dose:  Not Given


Iopamidol (Isovue-370 (76%))  100 ml IVPUSH ONETIME STA


   Stop: 03/17/20 06:02


   Last Admin: 03/17/20 06:04 Dose:  100 ml











- Exam


General: Alert, Oriented, Cooperative, Other (pale and lethargic. Answers 

questions, but very fatigue)


Lungs: Clear to Auscultation, Normal Respiratory Effort


Cardiovascular: Regular Rate, Regular Rhythm


GI/Abdominal Exam: Normal Bowel Sounds, Soft, Non-Tender


Extremities: Normal Inspection, Normal Range of Motion, Non-Tender, No Pedal 

Edema


Neurological: No New Focal Deficit


Psy/Mental Status: Alert, Normal Affect, Normal Mood





Sepsis Event Note





- Evaluation


Sepsis Screening Result: Sepsis Risk





- Focused Exam


Vital Signs: 


 Vital Signs











  Temp Pulse Resp BP Pulse Ox


 


 03/18/20 08:00  98.5 F  120 H  20  132/63  98


 


 03/18/20 04:00  98.4 F  111 H  22 H  104/56 L  95


 


 03/18/20 00:00  97.6 F  110 H  26 H  111/63  97











Date Exam was Performed: 03/18/20


Time Exam was Performed: 14:47





- Problem List & Annotations


(1) Palliative care status


SNOMED Code(s): 492583816


   Code(s): Z51.5 - ENCOUNTER FOR PALLIATIVE CARE   Status: Acute   Current 

Visit: Yes   





(2) GI bleed


SNOMED Code(s): 43002194


   Code(s): K92.2 - GASTROINTESTINAL HEMORRHAGE, UNSPECIFIED   Status: Acute   

Current Visit: Yes   





(3) Hypotension


SNOMED Code(s): 88028302


   Code(s): I95.9 - HYPOTENSION, UNSPECIFIED   Status: Acute   Current Visit: 

Yes   





- Problem List Review


Problem List Initiated/Reviewed/Updated: Yes





- My Orders


Last 24 Hours: 


My Active Orders





03/18/20 08:35


Transfuse PRBC [Transfuse Red Blood Cells] [COMM] Routine 














- Plan


Plan:: 





89 yo male admitted for acute GI bleed.  





1. Acue GI bleed


- Large BRB per rectum this morning, I called wife immediately asking goals of 

care as patient very lethargic and unable to completely answer. She reports he 

continues to decline transfer and would like blood if possible. IV access was 

lost during this time, nursing unable to gain further access. Wife and daughter 

arrived. Dr alonzo and myself discussed with family prognosis. They want no 

further IV attempts and agree with palliative care. 


- Hgb 5.8 this morning. 


- Palliative care status now


- Morphine and Ativan PRN comfort


- NG to remain in place for now due to GI bleed and coffee ground emesis. 

Unless causing pain. 


- Ice/sips of water ok. 


- Death imminent


- family at bedside